# Patient Record
Sex: FEMALE | Race: WHITE | URBAN - METROPOLITAN AREA
[De-identification: names, ages, dates, MRNs, and addresses within clinical notes are randomized per-mention and may not be internally consistent; named-entity substitution may affect disease eponyms.]

---

## 2017-01-20 ENCOUNTER — HOSPITAL ENCOUNTER (EMERGENCY)
Facility: CLINIC | Age: 32
Discharge: HOME | End: 2017-01-20
Attending: EMERGENCY MEDICINE | Admitting: EMERGENCY MEDICINE
Payer: COMMERCIAL

## 2017-01-20 ENCOUNTER — APPOINTMENT (OUTPATIENT)
Dept: ULTRASOUND IMAGING | Facility: CLINIC | Age: 32
End: 2017-01-20
Attending: EMERGENCY MEDICINE
Payer: COMMERCIAL

## 2017-01-20 VITALS
DIASTOLIC BLOOD PRESSURE: 71 MMHG | TEMPERATURE: 99 F | OXYGEN SATURATION: 99 % | SYSTOLIC BLOOD PRESSURE: 114 MMHG | WEIGHT: 164 LBS | RESPIRATION RATE: 16 BRPM

## 2017-01-20 DIAGNOSIS — N93.9 VAGINAL BLEEDING: ICD-10-CM

## 2017-01-20 DIAGNOSIS — R10.30 ABDOMINAL PAIN, LOWER: ICD-10-CM

## 2017-01-20 DIAGNOSIS — O20.0 THREATENED MISCARRIAGE: ICD-10-CM

## 2017-01-20 LAB
ALBUMIN UR-MCNC: NEGATIVE MG/DL
APPEARANCE UR: CLEAR
B-HCG SERPL-ACNC: 5137 IU/L
BILIRUB UR QL STRIP: NEGATIVE
COLOR UR AUTO: YELLOW
GLUCOSE UR STRIP-MCNC: NEGATIVE MG/DL
HGB BLD-MCNC: 12.1 G/DL (ref 11.7–15.7)
HGB UR QL STRIP: ABNORMAL
KETONES UR STRIP-MCNC: NEGATIVE MG/DL
LEUKOCYTE ESTERASE UR QL STRIP: ABNORMAL
MUCOUS THREADS #/AREA URNS LPF: PRESENT /LPF
NITRATE UR QL: NEGATIVE
PH UR STRIP: 5.5 PH (ref 5–7)
RBC #/AREA URNS AUTO: 1 /HPF (ref 0–2)
SP GR UR STRIP: 1.01 (ref 1–1.03)
SQUAMOUS #/AREA URNS AUTO: 1 /HPF (ref 0–1)
URN SPEC COLLECT METH UR: ABNORMAL
UROBILINOGEN UR STRIP-MCNC: NORMAL MG/DL (ref 0–2)
WBC #/AREA URNS AUTO: 1 /HPF (ref 0–2)

## 2017-01-20 PROCEDURE — 99284 EMERGENCY DEPT VISIT MOD MDM: CPT

## 2017-01-20 PROCEDURE — 84702 CHORIONIC GONADOTROPIN TEST: CPT | Performed by: EMERGENCY MEDICINE

## 2017-01-20 PROCEDURE — 76801 OB US < 14 WKS SINGLE FETUS: CPT

## 2017-01-20 PROCEDURE — 81001 URINALYSIS AUTO W/SCOPE: CPT | Performed by: EMERGENCY MEDICINE

## 2017-01-20 PROCEDURE — 85018 HEMOGLOBIN: CPT | Performed by: EMERGENCY MEDICINE

## 2017-01-20 PROCEDURE — 36415 COLL VENOUS BLD VENIPUNCTURE: CPT | Performed by: EMERGENCY MEDICINE

## 2017-01-20 ASSESSMENT — ENCOUNTER SYMPTOMS: ABDOMINAL PAIN: 1

## 2017-01-20 NOTE — ED PROVIDER NOTES
History     Chief Complaint:  Vaginal Bleeding      HPI   Cheryl Coles is a 31 year old  female who is 7 weeks pregnant who presents with onset of vaginal bleeding last night. The patient complains of spotting on her toilet tissue that was bright red and now reddish or brownish in color. The patient also complains of lower abdominal cramping. The patient states that movement makes the pain worse and states that the cramping feels similar to menstrual cramps. The patient denies falls trauma or falling. Of note, the patient had a blood draw done at [facility] on 2017 with the results showing her to have A POS blood type, HGB 13, and hematocrit being 3.93.    Allergies:  No known drug allergies.    Medications:    The patient is currently on no regular medications.     Past Medical History:    History reviewed.  No significant past medical history.     Past Surgical History:    History reviewed. No pertinent past surgical history.    Family History:    History reviewed. No pertinent family history.    Social History:  Marital Status:   Presents to the ED alone  PCP: [facility] [facility]    Review of Systems   Gastrointestinal: Positive for abdominal pain.   Genitourinary: Positive for vaginal bleeding.   All other systems reviewed and are negative.    Physical Exam   First Vitals:  BP: 116/72 mmHg  Heart Rate: 85  Temp: 99  F (37.2  C)  Resp: 18  Weight: 74.39 kg (164 lb)  SpO2: 99 %    Physical Exam   General: The patient is alert, in no respiratory distress.    HENT: Mucous membranes moist.    Cardiovascular: Regular rate and rhythm. Good pulses in all four extremities. Normal capillary refill and skin turgor.     Respiratory: Lungs are clear. No nasal flaring. No retractions. No wheezing, no crackles.    Gastrointestinal: Abdomen soft. No guarding, no rebound. No palpable hernias. Suprapubic abdominal tenderness.  Pelvis: Cervix is closed with no bleeding.    Musculoskeletal: No gross  deformity.     Skin: No rashes or petechiae.     Neurologic: The patient is alert and oriented x3. GCS 15. No testable cranial nerve deficit. Follows commands with clear and appropriate speech. Gives appropriate answers. Good strength in all extremities. No gross neurologic deficit. Gross sensation intact. Pupils are round and reactive. No meningismus.     Lymphatic: No cervical adenopathy. No lower extremity swelling.    Psychiatric: The patient is non-tearful.    Emergency Department Course   Imaging:  Radiographic findings were communicated with the patient who voiced understanding of the findings.      US OB < 14 Weeks w Transvaginal   Final Result   IMPRESSION: Single live intrauterine pregnancy of estimated   gestational age 6 weeks.      MRALIN WILBURN MD          All Readings Per Radiology Unless Otherwise Noted.    Laboratory:  Hemoglobin: 12.1  HCG Quantitative pregnancy: 5137  UA: Clear yellow urine, Blood Small, Leukocyte Esterase Moderate, Mucous Present, otherwise WNL    ED Course:  Nursing notes and past medical history reviewed.   I performed a physical examination of the patient as documented above.  I explained the plan with the patient who consents to this.   The patient was sent for a US OB < 14 weeks with transvaginal while in the emergency department, findings above.   Blood was drawn from the patient. This was sent for laboratory testing, findings above.   Urine sample was obtained and sent for laboratory analysis, findings above.  I personally reviewed the laboratory and imaging results with the patient and answered all related questions prior to discharge.  (1511) Rechecked patient and performed pelvic exam.  Findings and plan explained to the patient. Patient discharged home with instructions regarding supportive care, medications, and reasons to return. The importance of close follow-up was reviewed.     Impression & Plan    Medical Decision Making:  The patient is pregnant and said that  she already had an ultrasound but they could not definitively locate an IUP, there was a sac however therefore, ectopic is on the differential especially with the vaginal bleeding. By her pelvic exam performed by myself there was no active bleeding, her cervix was closed and infect, she does have an IUP with no subchorionic hemorrhage. The exact cause of the current blood is unknown and the patient is otherwise stable and her hemoglobin is adequate. I did access her outside labs and she is Rh positive and does not require Rhogam. She has a benign exam and no signs of a UTI. She was discharged to follow up with her OB/GYN and felt comfortable with the plan. The patient agrees to return if any new symptoms develop.    Diagnosis:    ICD-10-CM    1. Threatened miscarriage O20.0    2. Abdominal pain, lower R10.30    3. Vaginal bleeding N93.9        Disposition:   Discharge to home.    Dmitriy ALARCON, am serving as a scribe on 1/20/2017 at 8:43 AM to personally document services performed by Branden Murillo MD, based on my observations and the provider's statements to me.      Branden Murillo MD  01/21/17 [id number]

## 2017-01-20 NOTE — ED AVS SNAPSHOT
[facility] Emergency Department    [address]    [facility] [address]    Phone:  [phone]    Fax:  [phone]                                       Cheryl Coles   MRN: [id number]    Department:  [facility] Emergency Department   Date of Visit:  1/20/2017           After Visit Summary Signature Page     I have received my discharge instructions, and my questions have been answered. I have discussed any challenges I see with this plan with the nurse or doctor.    ..........................................................................................................................................  Patient/Patient Representative Signature      ..........................................................................................................................................  Patient Representative Print Name and Relationship to Patient    ..................................................               ................................................  Date                                            Time    ..........................................................................................................................................  Reviewed by Signature/Title    ...................................................              ..............................................  Date                                                            Time

## 2017-01-20 NOTE — ED NOTES
Alert and oriented x 3 airway,breathing and circulation intact, 7 weeks pregnant now is having some light vaginal bleeding(on tissue only) but lower abd cramping and bilateral back pain

## 2017-01-20 NOTE — ED AVS SNAPSHOT
[facility] Emergency Department    [address] [place] [address]    Phone:  [phone]    Fax:  [phone]                                       Cheryl Coles   MRN: [id number]    Department:  [facility] Emergency Department   Date of Visit:  1/20/2017           Patient Information     Date Of Birth          1985        Your diagnoses for this visit were:     Threatened miscarriage     Abdominal pain, lower     Vaginal bleeding        You were seen by Branden Murillo MD.      Follow-up Information     Follow up with your OB. Schedule an appointment as soon as possible for a visit in 4 days.        Discharge Instructions       Discharge Instructions  Vaginal Bleeding in Pregnancy    Bleeding in early pregnancy can be a sign of a miscarriage in process or an abnormal pregnancy, but often is innocent and the pregnancy will continue normally. We may do blood pregnancy tests and ultrasound to try to determine what is causing the bleeding in your case, but sometimes we can't tell and need to follow you with time, more blood tests, and another ultrasound.     Return to the Emergency Department if:    You have severe abdominal or pelvic pain.    You faint, or feel lightheaded or dizzy.     Your bleeding is gets much worse, and is heavier than a heavy period or if you pass any blood clots larger than a quarter.    You pass any tissue--solid material that doesn't appear smooth and even like a blood clot. If you pass tissue, save it (even if you have to pull it out of the toilet) and put it in a plastic bag or jar and bring it in.      You have a fever of 100.5 degrees or higher.  If no pregnancy could be seen:    It may be that everything is normal and it is just too early to see the pregnancy, but you could have an ectopic pregnancy, which is a pregnancy in an abnormal location, such as in the tube. An ectopic pregnancy can cause severe internal bleeding or  death.    You need to be seen by your regular doctor, or an OB/GYN doctor, in 48-72 hours for a repeat blood pregnancy test.    You should not be alone, in case you suddenly become very sick.     You should not have sex or put anything in your vagina.     If a pregnancy was seen in your uterus:    If a heartbeat could be seen, the chance of miscarriage is much lower.    You need to see your regular doctor, or an OB/GYN doctor, within 2-3 days.    You should not have sex or put anything in your vagina.     Facts about miscarriage: We hope you don't have a miscarriage, but if you do, here are important things to know:    Early miscarriage is very common, and having one miscarriage doesn't mean you will have problems with another pregnancy.    Nothing you did caused it. Taking medicine, drinking alcohol, having sex, exercising, or falling down won't cause a miscarriage.     If you were given a prescription for medicine here today, be sure to read all of the information (including the package insert) that comes with your prescription.  This will include important information about the medicine, its side effects, and any warnings that you need to know about.  The pharmacist who fills the prescription can provide more information and answer questions you may have about the medicine.  If you have questions or concerns that the pharmacist cannot address, please call or return to the Emergency Department.     Opioid Medication Information    Pain medications are among the most commonly prescribed medicines, so we are including this information for all our patients. If you did not receive pain medication or get a prescription for pain medicine, you can ignore it.     You may have been given a prescription for an opioid (narcotic) pain medicine and/or have received a pain medicine while here in the Emergency Department. These medicines can make you drowsy or impaired. You must not drive, operate dangerous equipment, or engage  in any other dangerous activities while taking these medications. If you drive while taking these medications, you could be arrested for DUI, or driving under the influence. Do not drink any alcohol while you are taking these medications.     Opioid pain medications can cause addiction. If you have a history of chemical dependency of any type, you are at a higher risk of becoming addicted to pain medications.  Only take these prescribed medications to treat your pain when all other options have been tried. Take it for as short a time and as few doses as possible. Store your pain pills in a secure place, as they are frequently stolen and provide a dangerous opportunity for children or visitors in your house to start abusing these powerful medications. We will not replace any lost or stolen medicine.  As soon as your pain is better, you should flush all your remaining medication.     Many prescription pain medications contain Tylenol  (acetaminophen), including Vicodin , Tylenol #3 , Norco , Lortab , and Percocet .  You should not take any extra pills of Tylenol  if you are using these prescription medications or you can get very sick.  Do not ever take more than 3000 mg of acetaminophen in any 24 hour period.    All opioids tend to cause constipation. Drink plenty of water and eat foods that have a lot of fiber, such as fruits, vegetables, prune juice, apple juice and high fiber cereal.  Take a laxative if you don t move your bowels at least every other day. Miralax , Milk of Magnesia, Colace , or Senna  can be used to keep you regular.      Remember that you can always come back to the Emergency Department if you are not able to see your regular doctor in the amount of time listed above, if you get any new symptoms, or if there is anything that worries you.        24 Hour Appointment Hotline       To make an appointment at any [facility], call [phone] ([phone]). If you don't have a family doctor  or clinic, we will help you find one. [facility] (formerly [facility]) are conveniently located to serve the needs of you and your family.             Review of your medicines      Notice     You have not been prescribed any medications.            Procedures and tests performed during your visit     HCG QUANTitative pregnancy    Hemoglobin    Prep for procedure - pelvic exam    UA with Microscopic    US OB < 14 Weeks w Transvaginal    Vital signs      Orders Needing Specimen Collection     None      Pending Results     No orders found from 1/19/2017 to 1/21/2017.            Pending Culture Results     No orders found from 1/19/2017 to 1/21/2017.       Test Results from your hospital stay           1/20/2017  7:28 AM - Interface, Flexilab Results      Component Results     Component Value Ref Range & Units Status    Hemoglobin 12.1 11.7 - 15.7 g/dL Final         1/20/2017  7:31 AM - Interface, Flexilab Results      Component Results     Component Value Ref Range & Units Status    Color Urine Yellow  Final    Appearance Urine Clear  Final    Glucose Urine Negative NEG mg/dL Final    Bilirubin Urine Negative NEG Final    Ketones Urine Negative NEG mg/dL Final    Specific Gravity Urine 1.014 1.003 - 1.035 Final    Blood Urine Small (A) NEG Final    pH Urine 5.5 5.0 - 7.0 pH Final    Protein Albumin Urine Negative NEG mg/dL Final    Urobilinogen mg/dL Normal 0.0 - 2.0 mg/dL Final    Nitrite Urine Negative NEG Final    Leukocyte Esterase Urine Moderate (A) NEG Final    Source Midstream Urine  Final    WBC Urine 1 0 - 2 /HPF Final    RBC Urine 1 0 - 2 /HPF Final    Squamous Epithelial /HPF Urine 1 0 - 1 /HPF Final    Mucous Urine Present (A) NEG /LPF Final         1/20/2017  8:06 AM - Interface, Flexilab Results      Component Results     Component Value Ref Range & Units Status    HCG Quantitative Serum 5137 IU/L Final    Specimen run with a dilution         1/20/2017  8:05 AM - Interface, Radiant Ib      Narrative     US OB <14 WEEKS  WITH TRANSVAGINAL SINGLE  1/20/2017 7:56 AM    HISTORY: Abdominal pain and vaginal bleeding during early pregnancy.    TECHNIQUE: Transabdominal and transvaginal imaging were performed.   Transvaginal imaging was performed to better evaluate the uterus and  gestational sac.    COMPARISON:  None.    FINDINGS:      Estimated gestational age by current ultrasound measurement: 6 weeks 0  days.  Estimated date of delivery based on this ultrasound: 9/15/2017.  Crown-rump length: 0.3 cm.   Embryonic cardiac activity: 120 bpm.   Yolk sac: Present.  Subchorionic hemorrhage: None.    Right ovary: Unremarkable.  Left ovary: Contains a small probable corpus luteum cyst.  Adnexal mass: None.  Free pelvic fluid: None.        Impression     IMPRESSION: Single live intrauterine pregnancy of estimated  gestational age 6 weeks.    MARLIN WILBURN MD                Clinical Quality Measure: Blood Pressure Screening     Your blood pressure was checked while you were in the emergency department today. The last reading we obtained was  BP: 114/71 mmHg . Please read the guidelines below about what these numbers mean and what you should do about them.  If your systolic blood pressure (the top number) is less than 120 and your diastolic blood pressure (the bottom number) is less than 80, then your blood pressure is normal. There is nothing more that you need to do about it.  If your systolic blood pressure (the top number) is 120-139 or your diastolic blood pressure (the bottom number) is 80-89, your blood pressure may be higher than it should be. You should have your blood pressure rechecked within a year by a primary care provider.  If your systolic blood pressure (the top number) is 140 or greater or your diastolic blood pressure (the bottom number) is 90 or greater, you may have high blood pressure. High blood pressure is treatable, but if left untreated over time it can put you at risk for heart attack, stroke, or kidney failure. You  "should have your blood pressure rechecked by a primary care provider within the next 4 weeks.  If your provider in the emergency department today gave you specific instructions to follow-up with your doctor or provider even sooner than that, you should follow that instruction and not wait for up to 4 weeks for your follow-up visit.        Thank you for choosing [facility]       Thank you for choosing [facility] for your care. Our goal is always to provide you with excellent care. Hearing back from our patients is one way we can continue to improve our services. Please take a few minutes to complete the written survey that you may receive in the mail after you visit with us. Thank you!        [employer] Information     [employer] lets you send messages to your doctor, view your test results, renew your prescriptions, schedule appointments and more. To sign up, go to [email or web address] . Click on \"Log in\" on the left side of the screen, which will take you to the Welcome page. Then click on \"Sign up Now\" on the right side of the page.     You will be asked to enter the access code listed below, as well as some personal information. Please follow the directions to create your username and password.     Your access code is: [email or web address]  Expires: 2017  9:00 AM     Your access code will  in 90 days. If you need help or a new code, please call your [facility] clinic or [phone].        Care EveryWhere ID     This is your Care EveryWhere ID. This could be used by other organizations to access your [facility] medical records  [id number]        After Visit Summary       This is your record. Keep this with you and show to your community pharmacist(s) and doctor(s) at your next visit.                  "

## 2017-01-20 NOTE — DISCHARGE INSTRUCTIONS
Discharge Instructions  Vaginal Bleeding in Pregnancy    Bleeding in early pregnancy can be a sign of a miscarriage in process or an abnormal pregnancy, but often is innocent and the pregnancy will continue normally. We may do blood pregnancy tests and ultrasound to try to determine what is causing the bleeding in your case, but sometimes we can't tell and need to follow you with time, more blood tests, and another ultrasound.     Return to the Emergency Department if:    You have severe abdominal or pelvic pain.    You faint, or feel lightheaded or dizzy.     Your bleeding is gets much worse, and is heavier than a heavy period or if you pass any blood clots larger than a quarter.    You pass any tissue--solid material that doesn't appear smooth and even like a blood clot. If you pass tissue, save it (even if you have to pull it out of the toilet) and put it in a plastic bag or jar and bring it in.      You have a fever of 100.5 degrees or higher.  If no pregnancy could be seen:    It may be that everything is normal and it is just too early to see the pregnancy, but you could have an ectopic pregnancy, which is a pregnancy in an abnormal location, such as in the tube. An ectopic pregnancy can cause severe internal bleeding or death.    You need to be seen by your regular doctor, or an OB/GYN doctor, in 48-72 hours for a repeat blood pregnancy test.    You should not be alone, in case you suddenly become very sick.     You should not have sex or put anything in your vagina.     If a pregnancy was seen in your uterus:    If a heartbeat could be seen, the chance of miscarriage is much lower.    You need to see your regular doctor, or an OB/GYN doctor, within 2-3 days.    You should not have sex or put anything in your vagina.     Facts about miscarriage: We hope you don't have a miscarriage, but if you do, here are important things to know:    Early miscarriage is very common, and having one miscarriage doesn't mean  you will have problems with another pregnancy.    Nothing you did caused it. Taking medicine, drinking alcohol, having sex, exercising, or falling down won't cause a miscarriage.     If you were given a prescription for medicine here today, be sure to read all of the information (including the package insert) that comes with your prescription.  This will include important information about the medicine, its side effects, and any warnings that you need to know about.  The pharmacist who fills the prescription can provide more information and answer questions you may have about the medicine.  If you have questions or concerns that the pharmacist cannot address, please call or return to the Emergency Department.     Opioid Medication Information    Pain medications are among the most commonly prescribed medicines, so we are including this information for all our patients. If you did not receive pain medication or get a prescription for pain medicine, you can ignore it.     You may have been given a prescription for an opioid (narcotic) pain medicine and/or have received a pain medicine while here in the Emergency Department. These medicines can make you drowsy or impaired. You must not drive, operate dangerous equipment, or engage in any other dangerous activities while taking these medications. If you drive while taking these medications, you could be arrested for DUI, or driving under the influence. Do not drink any alcohol while you are taking these medications.     Opioid pain medications can cause addiction. If you have a history of chemical dependency of any type, you are at a higher risk of becoming addicted to pain medications.  Only take these prescribed medications to treat your pain when all other options have been tried. Take it for as short a time and as few doses as possible. Store your pain pills in a secure place, as they are frequently stolen and provide a dangerous opportunity for children or visitors  in your house to start abusing these powerful medications. We will not replace any lost or stolen medicine.  As soon as your pain is better, you should flush all your remaining medication.     Many prescription pain medications contain Tylenol  (acetaminophen), including Vicodin , Tylenol #3 , Norco , Lortab , and Percocet .  You should not take any extra pills of Tylenol  if you are using these prescription medications or you can get very sick.  Do not ever take more than 3000 mg of acetaminophen in any 24 hour period.    All opioids tend to cause constipation. Drink plenty of water and eat foods that have a lot of fiber, such as fruits, vegetables, prune juice, apple juice and high fiber cereal.  Take a laxative if you don t move your bowels at least every other day. Miralax , Milk of Magnesia, Colace , or Senna  can be used to keep you regular.      Remember that you can always come back to the Emergency Department if you are not able to see your regular doctor in the amount of time listed above, if you get any new symptoms, or if there is anything that worries you.

## 2017-01-23 ENCOUNTER — HOSPITAL ENCOUNTER (EMERGENCY)
Facility: CLINIC | Age: 32
Discharge: HOME | End: 2017-01-24
Attending: EMERGENCY MEDICINE | Admitting: EMERGENCY MEDICINE
Payer: COMMERCIAL

## 2017-01-23 DIAGNOSIS — O03.9 COMPLETE MISCARRIAGE: ICD-10-CM

## 2017-01-23 PROCEDURE — 36415 COLL VENOUS BLD VENIPUNCTURE: CPT | Performed by: EMERGENCY MEDICINE

## 2017-01-23 PROCEDURE — 85025 COMPLETE CBC W/AUTO DIFF WBC: CPT | Performed by: EMERGENCY MEDICINE

## 2017-01-23 PROCEDURE — 99284 EMERGENCY DEPT VISIT MOD MDM: CPT

## 2017-01-23 PROCEDURE — 80048 BASIC METABOLIC PNL TOTAL CA: CPT | Performed by: EMERGENCY MEDICINE

## 2017-01-23 NOTE — ED AVS SNAPSHOT
[facility] Emergency Department    [address]    [facility] [address]    Phone:  [phone]    Fax:  [phone]                                       Cheryl Coles   MRN: [id number]    Department:  [facility] Emergency Department   Date of Visit:  1/23/2017           After Visit Summary Signature Page     I have received my discharge instructions, and my questions have been answered. I have discussed any challenges I see with this plan with the nurse or doctor.    ..........................................................................................................................................  Patient/Patient Representative Signature      ..........................................................................................................................................  Patient Representative Print Name and Relationship to Patient    ..................................................               ................................................  Date                                            Time    ..........................................................................................................................................  Reviewed by Signature/Title    ...................................................              ..............................................  Date                                                            Time

## 2017-01-24 ENCOUNTER — APPOINTMENT (OUTPATIENT)
Dept: ULTRASOUND IMAGING | Facility: CLINIC | Age: 32
End: 2017-01-24
Attending: EMERGENCY MEDICINE
Payer: COMMERCIAL

## 2017-01-24 VITALS
OXYGEN SATURATION: 99 % | WEIGHT: 168.43 LBS | SYSTOLIC BLOOD PRESSURE: 115 MMHG | HEART RATE: 102 BPM | HEIGHT: 64 IN | DIASTOLIC BLOOD PRESSURE: 78 MMHG | RESPIRATION RATE: 20 BRPM | TEMPERATURE: 97.7 F | BODY MASS INDEX: 28.76 KG/M2

## 2017-01-24 LAB
ANION GAP SERPL CALCULATED.3IONS-SCNC: 9 MMOL/L (ref 3–14)
BASOPHILS # BLD AUTO: 0 10E9/L (ref 0–0.2)
BASOPHILS NFR BLD AUTO: 0.3 %
BUN SERPL-MCNC: 9 MG/DL (ref 7–30)
CALCIUM SERPL-MCNC: 8.8 MG/DL (ref 8.5–10.1)
CHLORIDE SERPL-SCNC: 108 MMOL/L (ref 94–109)
CO2 SERPL-SCNC: 22 MMOL/L (ref 20–32)
CREAT SERPL-MCNC: 0.46 MG/DL (ref 0.52–1.04)
DIFFERENTIAL METHOD BLD: ABNORMAL
EOSINOPHIL # BLD AUTO: 0.2 10E9/L (ref 0–0.7)
EOSINOPHIL NFR BLD AUTO: 1.6 %
ERYTHROCYTE [DISTWIDTH] IN BLOOD BY AUTOMATED COUNT: 12.9 % (ref 10–15)
GFR SERPL CREATININE-BSD FRML MDRD: ABNORMAL ML/MIN/1.7M2
GLUCOSE SERPL-MCNC: 103 MG/DL (ref 70–99)
HCT VFR BLD AUTO: 38.9 % (ref 35–47)
HGB BLD-MCNC: 13.1 G/DL (ref 11.7–15.7)
IMM GRANULOCYTES # BLD: 0 10E9/L (ref 0–0.4)
IMM GRANULOCYTES NFR BLD: 0.3 %
LYMPHOCYTES # BLD AUTO: 3.1 10E9/L (ref 0.8–5.3)
LYMPHOCYTES NFR BLD AUTO: 25.7 %
MCH RBC QN AUTO: 28.2 PG (ref 26.5–33)
MCHC RBC AUTO-ENTMCNC: 33.7 G/DL (ref 31.5–36.5)
MCV RBC AUTO: 84 FL (ref 78–100)
MONOCYTES # BLD AUTO: 0.7 10E9/L (ref 0–1.3)
MONOCYTES NFR BLD AUTO: 5.8 %
NEUTROPHILS # BLD AUTO: 7.9 10E9/L (ref 1.6–8.3)
NEUTROPHILS NFR BLD AUTO: 66.3 %
NRBC # BLD AUTO: 0 10*3/UL
NRBC BLD AUTO-RTO: 0 /100
PLATELET # BLD AUTO: 342 10E9/L (ref 150–450)
POTASSIUM SERPL-SCNC: 3.7 MMOL/L (ref 3.4–5.3)
RBC # BLD AUTO: 4.65 10E12/L (ref 3.8–5.2)
SODIUM SERPL-SCNC: 139 MMOL/L (ref 133–144)
WBC # BLD AUTO: 11.9 10E9/L (ref 4–11)

## 2017-01-24 PROCEDURE — 76801 OB US < 14 WKS SINGLE FETUS: CPT

## 2017-01-24 ASSESSMENT — ENCOUNTER SYMPTOMS: DYSURIA: 0

## 2017-01-24 NOTE — ED PROVIDER NOTES
"  History     Chief Complaint:  Vaginal Bleeding      HPI   Cheryl Coles is a 31 year old  female approximately 6 weeks pregnant who presents with vaginal bleeding. The patient was seen here in the ED on  for vaginal bleeding and had an ultrasound demonstrating a single live IUP with estimated gestational age of 6 weeks. At that time, her quantitative HCG was also 5137. The patient was discharged with instructions to follow up with her OB/GYN who she saw earlier today. At her visit today with Dr. Villalpando, she had blood work completed [place] [address]. The patient states that tonight at , she developed increased bright red vaginal bleeding as well as cramping. She notes that she has been using one pad per day. Approximately 20 minutes ago, the patient states that she used the bathroom and noted that she passed clots. She denies any dysuria. According to the patient and per chart review in [facility], patient's blood type is A positive.     Allergies:  No known drug allergies.      Medications:    The patient is currently on no regular medications.       Past Medical History:    History reviewed.  No significant past medical history.       Past Surgical History:    History reviewed. No pertinent past surgical history.      Family History:    History reviewed. No pertinent family history.      Social History:  Marital Status:   Presents to the ED with   Tobacco Use: Never Used  Alcohol Use: No  PCP: [facility]      Review of Systems   Genitourinary: Positive for vaginal bleeding and pelvic pain. Negative for dysuria.   All other systems reviewed and are negative.    Physical Exam   First Vitals:  BP: 112/73 mmHg  Pulse: 102  Heart Rate: 102  Temp: 97.7  F (36.5  C)  Resp: 20  Height: 162.6 cm (5' 4\")  Weight: 76.4 kg (168 lb 6.9 oz)  SpO2: 99 %    Physical Exam  Constitutional:  Oriented to person, place, and time. Well Appearing  HENT:   Head: "    Normocephalic.   Mouth/Throat:   Oropharynx is clear and moist.   Eyes:    EOM are normal. Pupils are equal, round, and reactive to light.   Neck:    Neck supple.   Cardiovascular:  Normal rate, regular rhythm and normal heart sounds.      Exam reveals no gallop and no friction rub.       No murmur heard.  Pulmonary/Chest:  Effort normal and breath sounds normal.      No respiratory distress. No wheezes. No rales.      No reproducible chest wall pain.  Abdominal:   Soft. No distension. No tenderness. No rebound and no guarding.   :    Blood within the vaginal vault.     No obvious products of conception.     Cervix is closed. No CMT. No adnexal tenderness  Musculoskeletal:  Normal range of motion.   Neurological:   Alert and oriented to person, place, and time.           Moves all 4 extremities spontaneously    Skin:    No rash noted. No pallor.      Emergency Department Course   Imaging:  US OB < 14 Weeks with transvaginal:   No evidence of intrauterine pregnancy. There has been an  interval miscarriage since the prior exam.  Preliminary radiology read.     Radiographic findings were communicated with the patient who voiced understanding of the findings.    Laboratory:  CBC:  WBC 11.9 (H), HGB 13.1, , otherwise WNL   BMP: Glucose 103 (H), Creatinine 0.46 (L), otherwise WNL     Emergency Department Course:  Nursing notes and vitals reviewed.  I performed an exam of the patient as documented above.    Blood was drawn from the patient. This was sent for laboratory testing, findings above.    The patient was sent for a US OB while in the emergency department, findings above.    Findings and plan explained to the patient. Patient discharged home with instructions regarding supportive care, medications, and reasons to return. The importance of close follow-up was reviewed.   I personally reviewed the laboratory results with the patient and answered all related questions prior to discharge.      Impression &  Plan    Medical Decision Making:  Cheryl Coles is a 31 year old  who presents to the ED for evaluation of vaginal bleeding and abdominal cramping.  The patient is currently pregnant based on ultrasound on 17 showing  single live IUP with estimated gestational age of 6 weeks. She is Rh positive. Ultrasound was obtained and shows no evidence of intrauterine pregnancy.  These findings were discussed with the patient and miscarriage precautions and instructions were provided.    There is no evidence of retained products of conception.  The patient has  established care with OB and will follow up as instructed.  Return precautions given.          Diagnosis:    ICD-10-CM    1. Complete miscarriage O03.9        Disposition:  discharged to home        IJustyna, am serving as a scribe on 2017 at 11:29 PM to personally document services performed by Dr. Lindquist based on my observations and the provider's statements to me.   2017   [facility] EMERGENCY DEPARTMENT        Sj Lindquist MD  [phone]

## 2017-01-24 NOTE — ED NOTES
Reports vaginal bleeding began with spotting Friday, states moderate bleeding throughout the weekend and heavy bleeding today with possible tissue passed; states 6 weeks pregnant per home pregnancy test. States seen Friday in ED for spotting, told baby was ok.  ABCs intact.  States hx of 4 live births and 1 miscarriage.  States she is having severe cramping.

## 2017-01-24 NOTE — DISCHARGE INSTRUCTIONS
Completed Spontaneous Miscarriage  Today's exams show your pregnancy has ended suddenly. We understand that this is emotionally difficult. There is little we can say to change the way you feel. But understand that miscarriages are common.  About 1 or 2 out of every 10 pregnancies end this way. Some end even before you know you are pregnant. This happens for a number of reasons, and usually we never figure out why. It s important you know that it is not your fault. It didn t happen because you did anything wrong.  Having sex or exercising does not cause a miscarriage. These activities are usually safe unless you have pain or bleeding or your doctor tells you to stop. Even minor falls won t cause a miscarriage. Miscarriages happen because things were not developing as they were supposed to.  It appears that your miscarriage is complete. All tissue from the pregnancy should have passed out of your uterus. If parts of the pregnancy tissue remains in the uterus, you will probably have more cramping and bleeding. The bleeding can be light spotting or like a period, but it is usually not heavy. You may also pass some tissue.  After you have recovered, you should still be able to get pregnant again. But before trying, talk with your health care provider.  Home care  Follow these tips to take of  yourself at home:    You can go back to your normal activities if you don t have heavy bleeding or pain.    You may have some cramping and bleeding, but it shouldn t be severe.  Until the bleeding stops completely and to prevent infection:    Don t have sex until your health care provider says it s OK    Use sanitary napkins instead of tampons.    Don t douche.  Having a miscarriage can be very difficult emotionally. It is natural to feel sadness or grief. It may help to talk about your feelings with family and friends, or with a counselor.  Follow-up care  Make an appointment to see your health care provider in 1 to 2 weeks for a  checkup.  If cramping and bleeding return and continue for more than a few days, call your health care provider or return here for an exam. To prevent infection in the uterus, your provider might need to take out any tissue that remains. Or you may be given medication to take at home to help your body expel the rest of the tissue.  Note: If you had an ultrasound, a radiologist will review it. You will be told of any new findings that may affect your care.  Call 911  Call 911 if you have:    Severe pain and very heavy bleeding    Severe lightheadedness, passing out, or fainting    Rapid heart rate    Difficulty breathing    Confusion or difficulty waking up  When to seek medical advice  Call your health care provider right away if any of these occur:    Heavy bleeding. This means soaking 1 new pad an hour over 3 hours.    Bleeding that doesn t stop after 10 days    Foul-smelling vaginal discharge    Fever of 100.4 F (38 C) or higher, or as told by your health care provider    Pain in your lower belly (abdomen) that gets worse    Weakness or dizziness       5782-7469 [employer]. [address] [place] [address]. All rights reserved. This information is not intended as a substitute for professional medical care. Always follow your healthcare professional's instructions.      Discharge Instructions  Vaginal Bleeding in Pregnancy    Bleeding in early pregnancy can be a sign of a miscarriage in process or an abnormal pregnancy, but often is innocent and the pregnancy will continue normally. We may do blood pregnancy tests and ultrasound to try to determine what is causing the bleeding in your case, but sometimes we can't tell and need to follow you with time, more blood tests, and another ultrasound.     Return to the Emergency Department if:    You have severe abdominal or pelvic pain.    You faint, or feel lightheaded or dizzy.     Your bleeding is gets much worse, and is heavier than a heavy  period or if you pass any blood clots larger than a quarter.    You pass any tissue--solid material that doesn't appear smooth and even like a blood clot. If you pass tissue, save it (even if you have to pull it out of the toilet) and put it in a plastic bag or jar and bring it in.      You have a fever of 100.5 degrees or higher.  If no pregnancy could be seen:    It may be that everything is normal and it is just too early to see the pregnancy, but you could have an ectopic pregnancy, which is a pregnancy in an abnormal location, such as in the tube. An ectopic pregnancy can cause severe internal bleeding or death.    You need to be seen by your regular doctor, or an OB/GYN doctor, in 48-72 hours for a repeat blood pregnancy test.    You should not be alone, in case you suddenly become very sick.     You should not have sex or put anything in your vagina.     If a pregnancy was seen in your uterus:    If a heartbeat could be seen, the chance of miscarriage is much lower.    You need to see your regular doctor, or an OB/GYN doctor, within 2-3 days.    You should not have sex or put anything in your vagina.     Facts about miscarriage: We hope you don't have a miscarriage, but if you do, here are important things to know:    Early miscarriage is very common, and having one miscarriage doesn't mean you will have problems with another pregnancy.    Nothing you did caused it. Taking medicine, drinking alcohol, having sex, exercising, or falling down won't cause a miscarriage.     If you were given a prescription for medicine here today, be sure to read all of the information (including the package insert) that comes with your prescription.  This will include important information about the medicine, its side effects, and any warnings that you need to know about.  The pharmacist who fills the prescription can provide more information and answer questions you may have about the medicine.  If you have questions or  concerns that the pharmacist cannot address, please call or return to the Emergency Department.     Opioid Medication Information    Pain medications are among the most commonly prescribed medicines, so we are including this information for all our patients. If you did not receive pain medication or get a prescription for pain medicine, you can ignore it.     You may have been given a prescription for an opioid (narcotic) pain medicine and/or have received a pain medicine while here in the Emergency Department. These medicines can make you drowsy or impaired. You must not drive, operate dangerous equipment, or engage in any other dangerous activities while taking these medications. If you drive while taking these medications, you could be arrested for DUI, or driving under the influence. Do not drink any alcohol while you are taking these medications.     Opioid pain medications can cause addiction. If you have a history of chemical dependency of any type, you are at a higher risk of becoming addicted to pain medications.  Only take these prescribed medications to treat your pain when all other options have been tried. Take it for as short a time and as few doses as possible. Store your pain pills in a secure place, as they are frequently stolen and provide a dangerous opportunity for children or visitors in your house to start abusing these powerful medications. We will not replace any lost or stolen medicine.  As soon as your pain is better, you should flush all your remaining medication.     Many prescription pain medications contain Tylenol  (acetaminophen), including Vicodin , Tylenol #3 , Norco , Lortab , and Percocet .  You should not take any extra pills of Tylenol  if you are using these prescription medications or you can get very sick.  Do not ever take more than 3000 mg of acetaminophen in any 24 hour period.    All opioids tend to cause constipation. Drink plenty of water and eat foods that have a lot  of fiber, such as fruits, vegetables, prune juice, apple juice and high fiber cereal.  Take a laxative if you don t move your bowels at least every other day. Miralax , Milk of Magnesia, Colace , or Senna  can be used to keep you regular.      Remember that you can always come back to the Emergency Department if you are not able to see your regular doctor in the amount of time listed above, if you get any new symptoms, or if there is anything that worries you.

## 2017-08-29 LAB
HBV SURFACE AG SERPL QL IA: NEGATIVE
HIV 1+2 AB+HIV1 P24 AG SERPL QL IA: NEGATIVE
RUBELLA ANTIBODY IGG QUANTITATIVE: NORMAL IU/ML
T PALLIDUM IGG SER QL: NONREACTIVE

## 2018-03-06 ENCOUNTER — SURGERY (OUTPATIENT)
Age: 33
End: 2018-03-06

## 2018-03-06 ENCOUNTER — APPOINTMENT (OUTPATIENT)
Dept: ULTRASOUND IMAGING | Facility: CLINIC | Age: 33
End: 2018-03-06
Attending: OBSTETRICS & GYNECOLOGY
Payer: COMMERCIAL

## 2018-03-06 ENCOUNTER — ANESTHESIA EVENT (OUTPATIENT)
Dept: OBGYN | Facility: CLINIC | Age: 33
End: 2018-03-06
Payer: COMMERCIAL

## 2018-03-06 ENCOUNTER — HOSPITAL ENCOUNTER (INPATIENT)
Facility: CLINIC | Age: 33
LOS: 3 days | Discharge: HOME | End: 2018-03-09
Attending: OBSTETRICS & GYNECOLOGY | Admitting: OBSTETRICS & GYNECOLOGY
Payer: COMMERCIAL

## 2018-03-06 ENCOUNTER — ANESTHESIA (OUTPATIENT)
Dept: OBGYN | Facility: CLINIC | Age: 33
End: 2018-03-06
Payer: COMMERCIAL

## 2018-03-06 PROBLEM — O41.03X0 OLIGOHYDRAMNIOS IN THIRD TRIMESTER: Status: ACTIVE | Noted: 2018-03-06

## 2018-03-06 PROBLEM — O41.03X0 OLIGOHYDRAMNIOS IN THIRD TRIMESTER: Status: RESOLVED | Noted: 2018-03-06 | Resolved: 2018-03-06

## 2018-03-06 PROBLEM — O32.2XX0 TRANSVERSE LIE OF FETUS: Status: RESOLVED | Noted: 2018-03-06 | Resolved: 2018-03-06

## 2018-03-06 PROBLEM — O45.90 PLACENTAL ABRUPTION, DELIVERED, CURRENT HOSPITALIZATION: Status: ACTIVE | Noted: 2018-03-06

## 2018-03-06 PROBLEM — O45.93 PLACENTAL ABRUPTION IN THIRD TRIMESTER: Status: RESOLVED | Noted: 2018-03-06 | Resolved: 2018-03-06

## 2018-03-06 PROBLEM — O32.2XX0 TRANSVERSE LIE OF FETUS: Status: ACTIVE | Noted: 2018-03-06

## 2018-03-06 PROBLEM — O45.93 PLACENTAL ABRUPTION IN THIRD TRIMESTER: Status: ACTIVE | Noted: 2018-03-06

## 2018-03-06 PROBLEM — O41.00X0 OLIGOHYDRAMNIOS, DELIVERED, CURRENT HOSPITALIZATION: Status: ACTIVE | Noted: 2018-03-06

## 2018-03-06 PROBLEM — O46.93: Status: ACTIVE | Noted: 2018-03-06

## 2018-03-06 PROBLEM — O46.93: Status: RESOLVED | Noted: 2018-03-06 | Resolved: 2018-03-06

## 2018-03-06 PROBLEM — O32.2XX0 TRANSVERSE FETAL LIE, DELIVERED, CURRENT HOSPITALIZATION: Status: ACTIVE | Noted: 2018-03-06

## 2018-03-06 LAB
ABO + RH BLD: NORMAL
ABO + RH BLD: NORMAL
AMPHETAMINES UR QL SCN: NEGATIVE
BLD GP AB SCN SERPL QL: NORMAL
BLOOD BANK CMNT PATIENT-IMP: NORMAL
CANNABINOIDS UR QL: NEGATIVE
COCAINE UR QL: NEGATIVE
ERYTHROCYTE [DISTWIDTH] IN BLOOD BY AUTOMATED COUNT: 17 % (ref 10–15)
HCT VFR BLD AUTO: 30.9 % (ref 35–47)
HGB BLD-MCNC: 9.8 G/DL (ref 11.7–15.7)
MCH RBC QN AUTO: 26.7 PG (ref 26.5–33)
MCHC RBC AUTO-ENTMCNC: 31.7 G/DL (ref 31.5–36.5)
MCV RBC AUTO: 84 FL (ref 78–100)
OPIATES UR QL SCN: NEGATIVE
PCP UR QL SCN: NEGATIVE
PLATELET # BLD AUTO: 310 10E9/L (ref 150–450)
RBC # BLD AUTO: 3.67 10E12/L (ref 3.8–5.2)
SPECIMEN EXP DATE BLD: NORMAL
WBC # BLD AUTO: 7.7 10E9/L (ref 4–11)

## 2018-03-06 PROCEDURE — 25000125 ZZHC RX 250: Performed by: OBSTETRICS & GYNECOLOGY

## 2018-03-06 PROCEDURE — 86850 RBC ANTIBODY SCREEN: CPT | Performed by: OBSTETRICS & GYNECOLOGY

## 2018-03-06 PROCEDURE — 80307 DRUG TEST PRSMV CHEM ANLYZR: CPT | Performed by: OBSTETRICS & GYNECOLOGY

## 2018-03-06 PROCEDURE — 25800025 ZZH RX 258: Performed by: OBSTETRICS & GYNECOLOGY

## 2018-03-06 PROCEDURE — 36000058 ZZH SURGERY LEVEL 3 EA 15 ADDTL MIN: Performed by: OBSTETRICS & GYNECOLOGY

## 2018-03-06 PROCEDURE — 37000009 ZZH ANESTHESIA TECHNICAL FEE, EACH ADDTL 15 MIN: Performed by: OBSTETRICS & GYNECOLOGY

## 2018-03-06 PROCEDURE — 25000128 H RX IP 250 OP 636: Performed by: NURSE ANESTHETIST, CERTIFIED REGISTERED

## 2018-03-06 PROCEDURE — 86901 BLOOD TYPING SEROLOGIC RH(D): CPT | Performed by: OBSTETRICS & GYNECOLOGY

## 2018-03-06 PROCEDURE — 12000029 ZZH R&B OB INTERMEDIATE

## 2018-03-06 PROCEDURE — 88307 TISSUE EXAM BY PATHOLOGIST: CPT | Mod: 26 | Performed by: OBSTETRICS & GYNECOLOGY

## 2018-03-06 PROCEDURE — 71000015 ZZH RECOVERY PHASE 1 LEVEL 2 EA ADDTL HR: Performed by: OBSTETRICS & GYNECOLOGY

## 2018-03-06 PROCEDURE — 25000128 H RX IP 250 OP 636: Performed by: OBSTETRICS & GYNECOLOGY

## 2018-03-06 PROCEDURE — 88307 TISSUE EXAM BY PATHOLOGIST: CPT | Performed by: OBSTETRICS & GYNECOLOGY

## 2018-03-06 PROCEDURE — 76815 OB US LIMITED FETUS(S): CPT

## 2018-03-06 PROCEDURE — 36415 COLL VENOUS BLD VENIPUNCTURE: CPT | Performed by: OBSTETRICS & GYNECOLOGY

## 2018-03-06 PROCEDURE — 71000014 ZZH RECOVERY PHASE 1 LEVEL 2 FIRST HR: Performed by: OBSTETRICS & GYNECOLOGY

## 2018-03-06 PROCEDURE — 25000125 ZZHC RX 250: Performed by: NURSE ANESTHETIST, CERTIFIED REGISTERED

## 2018-03-06 PROCEDURE — 27210794 ZZH OR GENERAL SUPPLY STERILE: Performed by: OBSTETRICS & GYNECOLOGY

## 2018-03-06 PROCEDURE — 85027 COMPLETE CBC AUTOMATED: CPT | Performed by: OBSTETRICS & GYNECOLOGY

## 2018-03-06 PROCEDURE — 36000056 ZZH SURGERY LEVEL 3 1ST 30 MIN: Performed by: OBSTETRICS & GYNECOLOGY

## 2018-03-06 PROCEDURE — G0463 HOSPITAL OUTPT CLINIC VISIT: HCPCS

## 2018-03-06 PROCEDURE — 86900 BLOOD TYPING SEROLOGIC ABO: CPT | Performed by: OBSTETRICS & GYNECOLOGY

## 2018-03-06 PROCEDURE — 25000132 ZZH RX MED GY IP 250 OP 250 PS 637: Performed by: OBSTETRICS & GYNECOLOGY

## 2018-03-06 PROCEDURE — 37000008 ZZH ANESTHESIA TECHNICAL FEE, 1ST 30 MIN: Performed by: OBSTETRICS & GYNECOLOGY

## 2018-03-06 PROCEDURE — 27210995 ZZH RX 272: Performed by: OBSTETRICS & GYNECOLOGY

## 2018-03-06 RX ORDER — HYDROMORPHONE HYDROCHLORIDE 1 MG/ML
.3-.5 INJECTION, SOLUTION INTRAMUSCULAR; INTRAVENOUS; SUBCUTANEOUS EVERY 30 MIN PRN
Status: DISCONTINUED | OUTPATIENT
Start: 2018-03-06 | End: 2018-03-09 | Stop reason: HOSPADM

## 2018-03-06 RX ORDER — OXYCODONE HYDROCHLORIDE 5 MG/1
5-10 TABLET ORAL
Status: DISCONTINUED | OUTPATIENT
Start: 2018-03-06 | End: 2018-03-09 | Stop reason: HOSPADM

## 2018-03-06 RX ORDER — DIPHENHYDRAMINE HYDROCHLORIDE 50 MG/ML
25 INJECTION INTRAMUSCULAR; INTRAVENOUS EVERY 6 HOURS PRN
Status: DISCONTINUED | OUTPATIENT
Start: 2018-03-06 | End: 2018-03-09 | Stop reason: HOSPADM

## 2018-03-06 RX ORDER — ONDANSETRON 4 MG/1
4 TABLET, ORALLY DISINTEGRATING ORAL EVERY 30 MIN PRN
Status: DISCONTINUED | OUTPATIENT
Start: 2018-03-06 | End: 2018-03-06 | Stop reason: HOSPADM

## 2018-03-06 RX ORDER — NALOXONE HYDROCHLORIDE 0.4 MG/ML
.1-.4 INJECTION, SOLUTION INTRAMUSCULAR; INTRAVENOUS; SUBCUTANEOUS
Status: DISCONTINUED | OUTPATIENT
Start: 2018-03-06 | End: 2018-03-09 | Stop reason: HOSPADM

## 2018-03-06 RX ORDER — ONDANSETRON 2 MG/ML
4 INJECTION INTRAMUSCULAR; INTRAVENOUS EVERY 6 HOURS PRN
Status: DISCONTINUED | OUTPATIENT
Start: 2018-03-06 | End: 2018-03-06

## 2018-03-06 RX ORDER — CITRIC ACID/SODIUM CITRATE 334-500MG
30 SOLUTION, ORAL ORAL
Status: COMPLETED | OUTPATIENT
Start: 2018-03-06 | End: 2018-03-06

## 2018-03-06 RX ORDER — ACETAMINOPHEN 325 MG/1
975 TABLET ORAL ONCE
Status: COMPLETED | OUTPATIENT
Start: 2018-03-06 | End: 2018-03-06

## 2018-03-06 RX ORDER — OXYTOCIN/0.9 % SODIUM CHLORIDE 30/500 ML
100 PLASTIC BAG, INJECTION (ML) INTRAVENOUS CONTINUOUS
Status: DISCONTINUED | OUTPATIENT
Start: 2018-03-06 | End: 2018-03-09 | Stop reason: HOSPADM

## 2018-03-06 RX ORDER — METOCLOPRAMIDE 10 MG/1
10 TABLET ORAL EVERY 6 HOURS PRN
Status: DISCONTINUED | OUTPATIENT
Start: 2018-03-06 | End: 2018-03-06

## 2018-03-06 RX ORDER — MAGNESIUM HYDROXIDE 1200 MG/15ML
LIQUID ORAL PRN
Status: DISCONTINUED | OUTPATIENT
Start: 2018-03-06 | End: 2018-03-06

## 2018-03-06 RX ORDER — LABETALOL HYDROCHLORIDE 5 MG/ML
10 INJECTION, SOLUTION INTRAVENOUS
Status: DISCONTINUED | OUTPATIENT
Start: 2018-03-06 | End: 2018-03-06 | Stop reason: HOSPADM

## 2018-03-06 RX ORDER — HYDROCORTISONE 2.5 %
CREAM (GRAM) TOPICAL 3 TIMES DAILY PRN
Status: DISCONTINUED | OUTPATIENT
Start: 2018-03-06 | End: 2018-03-09 | Stop reason: HOSPADM

## 2018-03-06 RX ORDER — METOCLOPRAMIDE HYDROCHLORIDE 5 MG/ML
10 INJECTION INTRAMUSCULAR; INTRAVENOUS EVERY 6 HOURS PRN
Status: DISCONTINUED | OUTPATIENT
Start: 2018-03-06 | End: 2018-03-06

## 2018-03-06 RX ORDER — DIPHENHYDRAMINE HCL 25 MG
25 CAPSULE ORAL EVERY 6 HOURS PRN
Status: DISCONTINUED | OUTPATIENT
Start: 2018-03-06 | End: 2018-03-09 | Stop reason: HOSPADM

## 2018-03-06 RX ORDER — AMOXICILLIN 250 MG
1 CAPSULE ORAL 2 TIMES DAILY PRN
Status: DISCONTINUED | OUTPATIENT
Start: 2018-03-06 | End: 2018-03-09 | Stop reason: HOSPADM

## 2018-03-06 RX ORDER — METOCLOPRAMIDE HYDROCHLORIDE 5 MG/ML
10 INJECTION INTRAMUSCULAR; INTRAVENOUS EVERY 6 HOURS PRN
Status: DISCONTINUED | OUTPATIENT
Start: 2018-03-06 | End: 2018-03-09 | Stop reason: HOSPADM

## 2018-03-06 RX ORDER — DEXAMETHASONE SODIUM PHOSPHATE 4 MG/ML
4 INJECTION, SOLUTION INTRA-ARTICULAR; INTRALESIONAL; INTRAMUSCULAR; INTRAVENOUS; SOFT TISSUE
Status: DISCONTINUED | OUTPATIENT
Start: 2018-03-06 | End: 2018-03-06 | Stop reason: HOSPADM

## 2018-03-06 RX ORDER — KETOROLAC TROMETHAMINE 30 MG/ML
30 INJECTION, SOLUTION INTRAMUSCULAR; INTRAVENOUS ONCE
Status: COMPLETED | OUTPATIENT
Start: 2018-03-06 | End: 2018-03-06

## 2018-03-06 RX ORDER — PRENATAL VIT/IRON FUM/FOLIC AC 27MG-0.8MG
1 TABLET ORAL DAILY
COMMUNITY

## 2018-03-06 RX ORDER — HYDRALAZINE HYDROCHLORIDE 20 MG/ML
2.5-5 INJECTION INTRAMUSCULAR; INTRAVENOUS EVERY 10 MIN PRN
Status: DISCONTINUED | OUTPATIENT
Start: 2018-03-06 | End: 2018-03-06 | Stop reason: HOSPADM

## 2018-03-06 RX ORDER — HYDROMORPHONE HYDROCHLORIDE 1 MG/ML
.3-.5 INJECTION, SOLUTION INTRAMUSCULAR; INTRAVENOUS; SUBCUTANEOUS EVERY 5 MIN PRN
Status: DISCONTINUED | OUTPATIENT
Start: 2018-03-06 | End: 2018-03-06 | Stop reason: HOSPADM

## 2018-03-06 RX ORDER — KETOROLAC TROMETHAMINE 30 MG/ML
30 INJECTION, SOLUTION INTRAMUSCULAR; INTRAVENOUS
Status: DISCONTINUED | OUTPATIENT
Start: 2018-03-06 | End: 2018-03-06 | Stop reason: HOSPADM

## 2018-03-06 RX ORDER — OXYTOCIN 10 [USP'U]/ML
10 INJECTION, SOLUTION INTRAMUSCULAR; INTRAVENOUS
Status: DISCONTINUED | OUTPATIENT
Start: 2018-03-06 | End: 2018-03-09 | Stop reason: HOSPADM

## 2018-03-06 RX ORDER — LIDOCAINE 40 MG/G
CREAM TOPICAL
Status: DISCONTINUED | OUTPATIENT
Start: 2018-03-06 | End: 2018-03-09 | Stop reason: HOSPADM

## 2018-03-06 RX ORDER — NALOXONE HYDROCHLORIDE 0.4 MG/ML
.1-.4 INJECTION, SOLUTION INTRAMUSCULAR; INTRAVENOUS; SUBCUTANEOUS
Status: ACTIVE | OUTPATIENT
Start: 2018-03-06 | End: 2018-03-07

## 2018-03-06 RX ORDER — AMOXICILLIN 250 MG
2 CAPSULE ORAL 2 TIMES DAILY PRN
Status: DISCONTINUED | OUTPATIENT
Start: 2018-03-06 | End: 2018-03-09 | Stop reason: HOSPADM

## 2018-03-06 RX ORDER — FENTANYL CITRATE 50 UG/ML
25-50 INJECTION, SOLUTION INTRAMUSCULAR; INTRAVENOUS
Status: DISCONTINUED | OUTPATIENT
Start: 2018-03-06 | End: 2018-03-06 | Stop reason: HOSPADM

## 2018-03-06 RX ORDER — ACETAMINOPHEN 325 MG/1
650 TABLET ORAL EVERY 4 HOURS PRN
Status: DISCONTINUED | OUTPATIENT
Start: 2018-03-09 | End: 2018-03-09 | Stop reason: HOSPADM

## 2018-03-06 RX ORDER — OXYTOCIN/0.9 % SODIUM CHLORIDE 30/500 ML
340 PLASTIC BAG, INJECTION (ML) INTRAVENOUS CONTINUOUS PRN
Status: DISCONTINUED | OUTPATIENT
Start: 2018-03-06 | End: 2018-03-09 | Stop reason: HOSPADM

## 2018-03-06 RX ORDER — SODIUM CHLORIDE, SODIUM LACTATE, POTASSIUM CHLORIDE, CALCIUM CHLORIDE 600; 310; 30; 20 MG/100ML; MG/100ML; MG/100ML; MG/100ML
INJECTION, SOLUTION INTRAVENOUS CONTINUOUS
Status: DISCONTINUED | OUTPATIENT
Start: 2018-03-06 | End: 2018-03-06 | Stop reason: HOSPADM

## 2018-03-06 RX ORDER — DIMENHYDRINATE 50 MG/ML
25 INJECTION, SOLUTION INTRAMUSCULAR; INTRAVENOUS
Status: DISCONTINUED | OUTPATIENT
Start: 2018-03-06 | End: 2018-03-06 | Stop reason: HOSPADM

## 2018-03-06 RX ORDER — OXYTOCIN/0.9 % SODIUM CHLORIDE 30/500 ML
PLASTIC BAG, INJECTION (ML) INTRAVENOUS PRN
Status: DISCONTINUED | OUTPATIENT
Start: 2018-03-06 | End: 2018-03-06

## 2018-03-06 RX ORDER — SODIUM CHLORIDE, SODIUM LACTATE, POTASSIUM CHLORIDE, CALCIUM CHLORIDE 600; 310; 30; 20 MG/100ML; MG/100ML; MG/100ML; MG/100ML
INJECTION, SOLUTION INTRAVENOUS CONTINUOUS
Status: DISCONTINUED | OUTPATIENT
Start: 2018-03-06 | End: 2018-03-06

## 2018-03-06 RX ORDER — LANOLIN 100 %
OINTMENT (GRAM) TOPICAL
Status: DISCONTINUED | OUTPATIENT
Start: 2018-03-06 | End: 2018-03-09 | Stop reason: HOSPADM

## 2018-03-06 RX ORDER — SIMETHICONE 80 MG
80 TABLET,CHEWABLE ORAL 4 TIMES DAILY PRN
Status: DISCONTINUED | OUTPATIENT
Start: 2018-03-06 | End: 2018-03-09 | Stop reason: HOSPADM

## 2018-03-06 RX ORDER — CEFAZOLIN SODIUM 2 G/100ML
2 INJECTION, SOLUTION INTRAVENOUS
Status: COMPLETED | OUTPATIENT
Start: 2018-03-06 | End: 2018-03-06

## 2018-03-06 RX ORDER — IBUPROFEN 600 MG/1
600 TABLET, FILM COATED ORAL EVERY 6 HOURS
Status: DISCONTINUED | OUTPATIENT
Start: 2018-03-07 | End: 2018-03-09 | Stop reason: HOSPADM

## 2018-03-06 RX ORDER — ACETAMINOPHEN 325 MG/1
975 TABLET ORAL EVERY 8 HOURS
Status: DISPENSED | OUTPATIENT
Start: 2018-03-06 | End: 2018-03-09

## 2018-03-06 RX ORDER — KETOROLAC TROMETHAMINE 30 MG/ML
15 INJECTION, SOLUTION INTRAMUSCULAR; INTRAVENOUS EVERY 6 HOURS
Status: COMPLETED | OUTPATIENT
Start: 2018-03-07 | End: 2018-03-07

## 2018-03-06 RX ORDER — MISOPROSTOL 200 UG/1
400 TABLET ORAL
Status: DISCONTINUED | OUTPATIENT
Start: 2018-03-06 | End: 2018-03-09 | Stop reason: HOSPADM

## 2018-03-06 RX ORDER — OXYTOCIN/0.9 % SODIUM CHLORIDE 30/500 ML
PLASTIC BAG, INJECTION (ML) INTRAVENOUS
Status: DISCONTINUED
Start: 2018-03-06 | End: 2018-03-06 | Stop reason: HOSPADM

## 2018-03-06 RX ORDER — PROCHLORPERAZINE 25 MG
25 SUPPOSITORY, RECTAL RECTAL EVERY 12 HOURS PRN
Status: DISCONTINUED | OUTPATIENT
Start: 2018-03-06 | End: 2018-03-09 | Stop reason: HOSPADM

## 2018-03-06 RX ORDER — BISACODYL 10 MG
10 SUPPOSITORY, RECTAL RECTAL DAILY PRN
Status: DISCONTINUED | OUTPATIENT
Start: 2018-03-08 | End: 2018-03-09 | Stop reason: HOSPADM

## 2018-03-06 RX ORDER — ONDANSETRON 2 MG/ML
4 INJECTION INTRAMUSCULAR; INTRAVENOUS EVERY 6 HOURS PRN
Status: DISCONTINUED | OUTPATIENT
Start: 2018-03-06 | End: 2018-03-09 | Stop reason: HOSPADM

## 2018-03-06 RX ORDER — ONDANSETRON 2 MG/ML
4 INJECTION INTRAMUSCULAR; INTRAVENOUS EVERY 30 MIN PRN
Status: DISCONTINUED | OUTPATIENT
Start: 2018-03-06 | End: 2018-03-06 | Stop reason: HOSPADM

## 2018-03-06 RX ORDER — DEXTROSE MONOHYDRATE, SODIUM CHLORIDE, AND POTASSIUM CHLORIDE 50; 1.49; 4.5 G/1000ML; G/1000ML; G/1000ML
INJECTION, SOLUTION INTRAVENOUS CONTINUOUS
Status: DISCONTINUED | OUTPATIENT
Start: 2018-03-06 | End: 2018-03-09 | Stop reason: HOSPADM

## 2018-03-06 RX ORDER — MEPERIDINE HYDROCHLORIDE 50 MG/ML
12.5 INJECTION INTRAMUSCULAR; INTRAVENOUS; SUBCUTANEOUS EVERY 5 MIN PRN
Status: DISCONTINUED | OUTPATIENT
Start: 2018-03-06 | End: 2018-03-06 | Stop reason: HOSPADM

## 2018-03-06 RX ADMIN — OXYTOCIN-SODIUM CHLORIDE 0.9% IV SOLN 30 UNIT/500ML 500 ML: 30-0.9/5 SOLUTION at 12:10

## 2018-03-06 RX ADMIN — Medication 0.5 MG: at 17:58

## 2018-03-06 RX ADMIN — SODIUM CHLORIDE 1000 ML: 900 IRRIGANT IRRIGATION at 12:30

## 2018-03-06 RX ADMIN — PHENYLEPHRINE HYDROCHLORIDE 100 MCG: 10 INJECTION, SOLUTION INTRAMUSCULAR; INTRAVENOUS; SUBCUTANEOUS at 12:12

## 2018-03-06 RX ADMIN — OXYTOCIN-SODIUM CHLORIDE 0.9% IV SOLN 30 UNIT/500ML 100 ML/HR: 30-0.9/5 SOLUTION at 14:45

## 2018-03-06 RX ADMIN — PHENYLEPHRINE HYDROCHLORIDE 100 MCG: 10 INJECTION, SOLUTION INTRAMUSCULAR; INTRAVENOUS; SUBCUTANEOUS at 11:52

## 2018-03-06 RX ADMIN — SODIUM CITRATE AND CITRIC ACID MONOHYDRATE 30 ML: 500; 334 SOLUTION ORAL at 11:02

## 2018-03-06 RX ADMIN — CEFAZOLIN SODIUM 2 G: 2 INJECTION, SOLUTION INTRAVENOUS at 11:27

## 2018-03-06 RX ADMIN — POTASSIUM CHLORIDE, DEXTROSE MONOHYDRATE AND SODIUM CHLORIDE: 150; 5; 450 INJECTION, SOLUTION INTRAVENOUS at 19:33

## 2018-03-06 RX ADMIN — ACETAMINOPHEN 975 MG: 325 TABLET, FILM COATED ORAL at 11:02

## 2018-03-06 RX ADMIN — KETOROLAC TROMETHAMINE 30 MG: 30 INJECTION, SOLUTION INTRAMUSCULAR at 19:32

## 2018-03-06 RX ADMIN — PHENYLEPHRINE HYDROCHLORIDE 150 MCG: 10 INJECTION, SOLUTION INTRAMUSCULAR; INTRAVENOUS; SUBCUTANEOUS at 11:50

## 2018-03-06 RX ADMIN — SODIUM CHLORIDE, POTASSIUM CHLORIDE, SODIUM LACTATE AND CALCIUM CHLORIDE: 600; 310; 30; 20 INJECTION, SOLUTION INTRAVENOUS at 09:47

## 2018-03-06 RX ADMIN — ACETAMINOPHEN 975 MG: 325 TABLET, FILM COATED ORAL at 19:32

## 2018-03-06 RX ADMIN — Medication 0.5 MG: at 14:45

## 2018-03-06 RX ADMIN — ONDANSETRON 4 MG: 2 INJECTION INTRAMUSCULAR; INTRAVENOUS at 11:48

## 2018-03-06 RX ADMIN — CEFAZOLIN SODIUM 1 G: 2 INJECTION, SOLUTION INTRAVENOUS at 12:13

## 2018-03-06 RX ADMIN — SODIUM CHLORIDE, POTASSIUM CHLORIDE, SODIUM LACTATE AND CALCIUM CHLORIDE: 600; 310; 30; 20 INJECTION, SOLUTION INTRAVENOUS at 11:26

## 2018-03-06 RX ADMIN — SODIUM CHLORIDE, POTASSIUM CHLORIDE, SODIUM LACTATE AND CALCIUM CHLORIDE: 600; 310; 30; 20 INJECTION, SOLUTION INTRAVENOUS at 11:20

## 2018-03-06 RX ADMIN — SODIUM CHLORIDE, POTASSIUM CHLORIDE, SODIUM LACTATE AND CALCIUM CHLORIDE: 600; 310; 30; 20 INJECTION, SOLUTION INTRAVENOUS at 12:30

## 2018-03-06 RX ADMIN — SODIUM CHLORIDE, POTASSIUM CHLORIDE, SODIUM LACTATE AND CALCIUM CHLORIDE 1000 ML: 600; 310; 30; 20 INJECTION, SOLUTION INTRAVENOUS at 10:54

## 2018-03-06 ASSESSMENT — LIFESTYLE VARIABLES: TOBACCO_USE: 0

## 2018-03-06 NOTE — IP AVS SNAPSHOT
MRN:[id number]                      After Visit Summary   3/6/2018    Cheryl Coles    MRN: [id number]           Thank you!     Thank you for choosing [facility] for your care. Our goal is always to provide you with excellent care. Hearing back from our patients is one way we can continue to improve our services. Please take a few minutes to complete the written survey that you may receive in the mail after you visit. If you would like to speak to someone directly about your visit please contact Patient Relations at [phone]. Thank you!          Patient Information     Date Of Birth          1985        Designated Caregiver       Most Recent Value    Caregiver    Will someone help with your care after discharge? no      About your hospital stay     You were admitted on:  2018 You last received care in the:  [facility]    You were discharged on:  2018        Reason for your hospital stay       Maternity care                  Who to Call     For medical emergencies, please call 911.  For non-urgent questions about your medical care, please call your primary care provider or clinic, [phone]  For questions related to your surgery, please call your surgery clinic        Attending Provider     Provider Specialty    Manny Gautam MD OB/Gyn       Primary Care Provider Office Phone # Fax #    [facility] [phone]      After Care Instructions     Activity       Review discharge instructions            Diet       Resume previous diet            Discharge Instructions - Postpartum visit       Schedule postpartum visit with your provider and return to clinic in 6 weeks.                  Further instructions from your care team       LACTATION: [phone]       Postop  Birth Instructions    Activity       Do not lift more than 10 pounds for 6 weeks after surgery.  Ask family and friends for help when you need  it.    No driving until you have stopped taking your pain medications (usually two weeks after surgery).    No heavy exercise or activity for 6 weeks.  Don't do anything that will put a strain on your surgery site.    Don't strain when using the toilet.  Your care team may prescribe a stool softener if you have problems with your bowel movements.     To care for your incision:       Keep the incision clean and dry.    Do not soak your incision in water. No swimming or hot tubs until it has fully healed. You may soak in the bathtub if the water level is below your incision.    Do not use peroxide, gel, cream, lotion, or ointment on your incision.    Adjust your clothes to avoid pressure on your surgery site (check the elastic in your underwear for example).     You may see a small amount of clear or pink drainage and this is normal.  Check with your health care provider:       If the drainage increases or has an odor.    If the incision reddens, you have swelling, or develop a rash.    If you have increased pain and the medicine we prescribed doesn't help.    If you have a fever above 100.4 F (38 C) with or without chills when placing thermometer under your tongue.   The area around your incision (surgery wound), will feel numb.  This is normal. The numbness should go away in less than a year.     Keep your hands clean:  Always wash your hands before touching your incision (surgery wound). This helps reduce your risk of infection. If your hands aren't dirty, you may use an alcohol hand-rub to clean your hands. Keep your nails clean and short.    Call your healthcare provider if you have any of these symptoms:       You soak a sanitary pad with blood within 1 hour, or you see blood clots larger than a golf ball.    Bleeding that lasts more than 6 weeks.    Vaginal discharge that smells bad.    Severe pain, cramping or tenderness in your lower belly area.    A need to urinate more frequently (use the toilet more  "often), more urgently (use the toilet very quickly), or it burns when you urinate.    Nausea and vomiting.    Redness, swelling or pain around a vein in your leg.    Problems breastfeeding or a red or painful area on your breast.    Chest pain and cough or are gasping for air.    Problems with coping with sadness, anxiety or depression. If you have concerns about hurting yourself or the baby, call your provider immediately.      You have questions or concerns after you return home.                  Pending Results     No orders found from 3/4/2018 to 3/7/2018.            Statement of Approval     Ordered          18 0827  I have reviewed and agree with all the recommendations and orders detailed in this document.  EFFECTIVE NOW     Approved and electronically signed by:  Mora Parnell DO             Admission Information     Date & Time Provider Department Dept. Phone    3/6/2018 Manny Gautam MD [facility] [phone]      Your Vitals Were     Blood Pressure Pulse Temperature Respirations Height Weight    106/68 76 97.8  F (36.6  C) (Oral) 16 1.626 m (5' 4\") 86.2 kg (190 lb)    Last Period Pulse Oximetry BMI (Body Mass Index)             2017 (Exact Date) 99% 32.61 kg/m2         MyChart Information     [employer] lets you send messages to your doctor, view your test results, renew your prescriptions, schedule appointments and more. To sign up, go to [email or web address] . Click on \"Log in\" on the left side of the screen, which will take you to the Welcome page. Then click on \"Sign up Now\" on the right side of the page.     You will be asked to enter the access code listed below, as well as some personal information. Please follow the directions to create your username and password.     Your access code is: [email or web address]  Expires: 2018 10:52 AM     Your access code will  in 90 days. If you need help or a new code, please call your [facility] or [phone].      "   Care EveryWhere ID     This is your Care EveryWhere ID. This could be used by other organizations to access your [place] medical records  [id number]        Equal Access to Services     SADIA CHANG : Akhil Santoro, hilton vera, jeanettealice trammelllaurent judd, manisha virgen lakarolinaisa carlos. [facility] [phone].    ATENCIÓN: Si habla español, tiene a siddiqui disposición servicios gratuitos de asistencia lingüística. Llame al [phone].    We comply with applicable federal civil rights laws and [place] laws. We do not discriminate on the basis of race, color, national origin, age, disability, sex, sexual orientation, or gender identity.               Review of your medicines      START taking        Dose / Directions    breast pump Misc   Used for:  Breast feeding status of mother        Dose:  1 each   1 each as needed   Quantity:  1 each   Refills:  0       ibuprofen 600 MG tablet   Commonly known as:  ADVIL/MOTRIN        Dose:  600 mg   Take 1 tablet (600 mg) by mouth every 6 hours   Quantity:  60 tablet   Refills:  0       oxyCODONE IR 5 MG tablet   Commonly known as:  ROXICODONE        Dose:  5-10 mg   Take 1-2 tablets (5-10 mg) by mouth every 3 hours as needed for other (pain control or improvement in physical function. Hold dose for analgesic side effects.)   Quantity:  30 tablet   Refills:  0       senna-docusate 8.6-50 MG per tablet   Commonly known as:  SENOKOT-S;PERICOLACE        Dose:  1-2 tablet   Take 1-2 tablets by mouth daily as needed for constipation   Quantity:  60 tablet   Refills:  0         CONTINUE these medicines which may have CHANGED, or have new prescriptions. If we are uncertain of the size of tablets/capsules you have at home, strength may be listed as something that might have changed.        Dose / Directions    ferrous sulfate 325 (65 FE) MG tablet   Commonly known as:  IRON SUPPLEMENT   This may have changed:    - medication strength  - how much to  take  - when to take this        Dose:  325 mg   Take 1 tablet (325 mg) by mouth 2 times daily   Quantity:  60 tablet   Refills:  1         CONTINUE these medicines which have NOT CHANGED        Dose / Directions    prenatal multivitamin plus iron 27-0.8 MG Tabs per tablet        Dose:  1 tablet   Take 1 tablet by mouth daily   Refills:  0            Where to get your medicines      Some of these will need a paper prescription and others can be bought over the counter. Ask your nurse if you have questions.     Bring a paper prescription for each of these medications     breast pump Misc    ferrous sulfate 325 (65 FE) MG tablet    ibuprofen 600 MG tablet    oxyCODONE IR 5 MG tablet    senna-docusate 8.6-50 MG per tablet                Protect others around you: Learn how to safely use, store and throw away your medicines at www.disposemymeds.org.        Information about OPIOIDS     PRESCRIPTION OPIOIDS: WHAT YOU NEED TO KNOW    Prescription opioids can be used to help relieve moderate to severe pain and are often prescribed following a surgery or injury, or for certain health conditions. These medications can be an important part of treatment but also come with serious risks. It is important to work with your health care provider to make sure you are getting the safest, most effective care.    WHAT ARE THE RISKS AND SIDE EFFECTS OF OPIOID USE?  Prescription opioids carry serious risks of addiction and overdose, especially with prolonged use. An opioid overdose, often marked by slowed breathing can cause sudden death. The use of prescription opioids can have a number of side effects as well, even when taken as directed:      Tolerance - meaning you might need to take more of a medication for the same pain relief    Physical dependence - meaning you have symptoms of withdrawal when a medication is stopped    Increased sensitivity to pain    Constipation    Nausea, vomiting, and dry mouth    Sleepiness and  dizziness    Confusion    Depression    Low levels of testosterone that can result in lower sex drive, energy, and strength    Itching and sweating    RISKS ARE GREATER WITH:    History of drug misuse, substance use disorder, or overdose    Mental health conditions (such as depression or anxiety)    Sleep apnea    Older age (65 years or older)    Pregnancy    Avoid alcohol while taking prescription opioids.   Also, unless specifically advised by your health care provider, medications to avoid include:    Benzodiazepines (such as Xanax or Valium)    Muscle relaxants (such as Soma or Flexeril)    Hypnotics (such as Ambien or Lunesta)    Other prescription opioids    KNOW YOUR OPTIONS:  Talk to your health care provider about ways to manage your pain that do not involve prescription opioids. Some of these options may actually work better and have fewer risks and side effects:    Pain relievers such as acetaminophen, ibuprofen, and naproxen    Some medications that are also used for depression or seizures    Physical therapy and exercise    Cognitive behavioral therapy, a psychological, goal-directed approach, in which patients learn how to modify physical, behavioral, and emotional triggers of pain and stress    IF YOU ARE PRESCRIBED OPIOIDS FOR PAIN:    Never take opioids in greater amounts or more often than prescribed    Follow up with your primary health care provider and work together to create a plan on how to manage your pain.    Talk about ways to help manage your pain that do not involve prescription opioids    Talk about all concerns and side effects    Help prevent misuse and abuse    Never sell or share prescription opioids    Never use another person's prescription opioids    Store prescription opioids in a secure place and out of reach of others (this may include visitors, children, friends, and family)    Visit www.cdc.gov/drugoverdose to learn about risks of opioid abuse and overdose    If you believe  you may be struggling with addiction, tell your health care provider and ask for guidance or call [facility]'s National Helpline at 5-193-210-HELP    LEARN MORE / www.cdc.gov/drugoverdose/prescribing/guideline.html    Safely dispose of unused prescription opioids: Find your local drug take-back programs and more information about the importance of safe disposal at www.doseofreality.[place].gov             Medication List: This is a list of all your medications and when to take them. Check marks below indicate your daily home schedule. Keep this list as a reference.      Medications           Morning Afternoon Evening Bedtime As Needed    breast pump Misc   1 each as needed                                ferrous sulfate 325 (65 FE) MG tablet   Commonly known as:  IRON SUPPLEMENT   Take 1 tablet (325 mg) by mouth 2 times daily   Last time this was given:  325 mg on 3/9/2018 10:08 AM                                ibuprofen 600 MG tablet   Commonly known as:  ADVIL/MOTRIN   Take 1 tablet (600 mg) by mouth every 6 hours   Last time this was given:  600 mg on 3/9/2018 10:08 AM                                oxyCODONE IR 5 MG tablet   Commonly known as:  ROXICODONE   Take 1-2 tablets (5-10 mg) by mouth every 3 hours as needed for other (pain control or improvement in physical function. Hold dose for analgesic side effects.)   Last time this was given:  5 mg on 3/8/2018 12:44 AM                                prenatal multivitamin plus iron 27-0.8 MG Tabs per tablet   Take 1 tablet by mouth daily                                senna-docusate 8.6-50 MG per tablet   Commonly known as:  SENOKOT-S;PERICOLACE   Take 1-2 tablets by mouth daily as needed for constipation   Last time this was given:  1 tablet on 3/8/2018  9:16 PM

## 2018-03-06 NOTE — ANESTHESIA POSTPROCEDURE EVALUATION
Patient: Cheryl Coles    Procedure(s):   - Wound Class: II-Clean Contaminated    Diagnosis: section  Diagnosis Additional Information: Placental abruption    Anesthesia Type:  Spinal    Note:  Anesthesia Post Evaluation    Patient location during evaluation: PACU  Patient participation: Able to fully participate in evaluation  Level of consciousness: awake and alert  Pain management: adequate  Airway patency: patent  Cardiovascular status: acceptable  Respiratory status: acceptable  Hydration status: acceptable  PONV: controlled     Anesthetic complications: None          Last vitals:  Vitals:    18 1256 18 1300 18 1305   BP: 101/56 94/51 95/53   Pulse:      Resp:  18 16   Temp:  97.9  F (36.6  C)    SpO2:  97% 98%         Electronically Signed By: Jenaro Chavis MD  2018  1:17 PM

## 2018-03-06 NOTE — PROVIDER NOTIFICATION
18 1055   Provider Notification   Provider Name/Title Dr Gamez   Method of Notification At Bedside   Request Evaluate in Person   Notification Reason Lab/Diagnostic Study   Dr gamez at bedside discussing POC with pt and . Will go for  section for placenta abruption.

## 2018-03-06 NOTE — ANESTHESIA CARE TRANSFER NOTE
Patient: Cheryl Coles    Procedure(s):   - Wound Class: II-Clean Contaminated    Diagnosis:  section  Diagnosis Additional Information: No value filed.    Anesthesia Type:   Spinal     Note:  Airway :Room Air  Patient transferred to:Labor and Delivery  Comments: VSS.  Spontaneously breathing room air.  Report given to RN.Handoff Report: Identifed the Patient, Identified the Reponsible Provider, Reviewed the pertinent medical history, Discussed the surgical course, Reviewed Intra-OP anesthesia mangement and issues during anesthesia, Set expectations for post-procedure period and Allowed opportunity for questions and acknowledgement of understanding      Vitals: (Last set prior to Anesthesia Care Transfer)    CRNA VITALS  3/6/2018 1223 - 3/6/2018 1257      3/6/2018             Pulse: 93    SpO2: 100 %                Electronically Signed By: RICK Valdez CRNA  2018  12:57 PM

## 2018-03-06 NOTE — PLAN OF CARE
Dilaudid given for pain. Tolerating clear liquids.   at bedside, very supportive. Baby skin to skin for much of recovery.

## 2018-03-06 NOTE — PROVIDER NOTIFICATION
"   18 0920   Provider Notification   Provider Name/Title Dr Gautam   Method of Notification At Bedside   Request Evaluate in Person   Notification Reason Patient Arrived;Membrane Status;Bleeding;SVE   Data: Patient presented to [facility]: 3/6/2018  8:14 AM.  Reason for maternal/fetal assessment is vaginal bleeding. Patient reports \"gush of blood this morning and then water leaking\" blood seen at perineum, bright red. No leakinf watery fluid seen.  Patient is a .  Prenatal record reviewed. Pregnancy has been uncomplicated..  Gestational Age 35w2d. VSS. Fetal movement present. Patient denies abdominal pain, vomiting, headache, visual disturbances, epigastric or URQ pain, significant edema. Support person is present.   Action: Verbal consent for EFM. Triage assessment completed. Bill of rights reviewed.  Response: Patient verbalized agreement with plan. Dr Gautam at bedside for SVE, unable to reach cervix. Labs and ultra sound ordered. Will place IV .    "

## 2018-03-06 NOTE — PLAN OF CARE
Dr Gautam on unit. Aware that bedside ultra sound complete. Waiting on results. Updated MD that pt continues to have vaginal bleeding and on lab rfesults.

## 2018-03-06 NOTE — OP NOTE
Operative Note    Pre-op Diagnosis:  Intrauterine Pregnancy at 35 25/7wks, Placenta abruption, Oligohydramnios, Transverse lie    Post-op Diagnosis:  Same,  live male infant delivered    Procedure: Primary Low Transverse  Section    Surgeon:  Manny Gautam MD  Assistant:  Justyna Muijca MD  Anesthesia:  Spinal  Fluids:  1000 cc LR, Ancef 2gm pre-op, Pitocin after cord clamp  EBL:  1200 cc  Drains:  Cobos - clear yellow urine during the case    Indications for Procedure:  Patient is a 32 year old year old  with an intrauterine pregnancy at 35 2/7 weeks who presented with moderate vaginal bleeding.  She is also ovi.  Her hemoglobin was 9.8.  Ultrasound did not show an abruption overtly, however there was a possible clot near the cervix, and there was oligohydramnios as well as transverse lie.  Therefore she is to undergo primary  section for likely placenta abruption based on clinical findings.  She understands risks, benefits, and alternatives to the above and has given informed consent.      Findings:  Term live male infant, Back-down transverse lie with vertex in maternal left upper quadrant and ultimately delivered breech, 6# 7oz. (2910 g), Apgars 8(1min) 9(5min), amniotic fluid-Clear, 3v cord, No loop nuchal cord, large clot and red blood on entry into the uterine cavity consistent with abruption, fundal placenta, normal uterus.    Specimens:  Placenta, Cord gases pending    Procedure in detail:  After proper patient identification and informed consent was obtained, the patient was taken to the operating room where adequate Spinal anesthesia was obtained.  Patient was placed in dorsal supine position with leftward tilt.  Cobos catheter was placed.  Patient was prepped and draped in usual sterile manner for this procedure.  Dr. Mujica was required to act as first assistant to assist with retraction.    A Pfannenstiel skin incision was made with a knife and carried down  through the underlying subcutaneous fat to the anterior rectus fascia.  Bleeders were cauterized along the way.  The anterior rectus fascia was nicked transversely with a knife and the incision was extended bilaterally in a semilunar fashion with curved Bragg scissors.  The superior and inferior edges of the fascial incision were tented with Kocher clamps and sharply dissected from the underlying rectus muscle bellies.  After this was performed, the peritoneal incision was extended superiorly and inferiorly avoiding the bladder.  Bladder blade was placed.  Vesicouterine peritoneum overlying the lower uterine segment was incised with a Metzenbaum scissors and the incision was extended bilaterally and the bladder flap was created with sharp dissection.  The bladder blade was placed to retract the bladder flap out of harm's way.    The lower uterine segment was then scored transversely with a knife superficially and then the incision was carried down in the central portion of the score line until the amniotic space was reached.  There was a large clot and several hundred ccs of red blood in the lower uterine cavity on entry.  Amniotic fluid was clear however on amniotomy.  Blunt traction was applied in a craniocaudad manner, extended the incision along the score line.  The baby's position was palpated as back-down transverse with head in left upper quadrant.  The position was rotated to breech and the feet were sequentially identified and brought down through the incision.  The breech was atraumatically delivered as it was lifted up through the incision with moderate fundal pressure until the level of the umbilicus.  The infant was then delivered to the level of the scapula.  The infant was rotated counterclockwise and the right arm was flexed and delivered.  The infant was rotated clockwise, and the left arm was flexed and delivered.  The head was delivered using a modified Mauriceau maneuver atraumatically.  The  oropharynx and nares were bulb suctioned on the operative field.  A 3v cord that had no nuchal loop was noted and was doubly clamped and cut and the infant was handed off to the NICU staff in attendance.  Cord blood was obtained.  The patient received IV Pitocin.  She received Ancef preoperatively.  Placenta was delivered spontaneously with gentle cord traction and noted to be otherwise normal and intact.  The uterine cavity was cleared of all clots and debris.  The uterine incision was closed using a running locking #1 chromic gut suture and then followed by an imbricating suture of #1 chromic gut in a modified Lembert stitch.  The uterine incision was hemostatic at this point.    The pericolic gutters were copiously irrigated with warm normal saline and cleared of any clots and debris.  Reinspection of all operative sites confirmed good hemostasis.    The rectus muscle bellies were then loosely reapproximated in the midline using interrupted simple 2-0 Vicryl sutures to try to reduce the likelihood of diastasis.  The subfascial space was copiously irrigated with warm normal saline and any remaining bleeders were cauterized.  The anterior rectus fascia was closed using running 0 PDS suture.  The subcutaneous layer was copiously irrigated with warm normal saline and any remaining bleeders were cauterized.  Subcutaneous layer was reapproximated using running 2-0 Monocryl sutures to close the dead space.  Skin was closed using running 4-0 Vicryl subcuticular suture.  Steri-Strips were placed.    The patient tolerated the procedure well.  Sponge, instrument and needle counts were correct x3.  The patient was taken to the recovery room in stable condition.      LEONELA KINGSTON MD

## 2018-03-06 NOTE — ANESTHESIA PROCEDURE NOTES
Peripheral nerve/Neuraxial procedure note : intrathecal  Pre-Procedure  Performed by ZULAY LAST  Location: OR      Pre-Anesthestic Checklist: patient identified, IV checked, site marked, risks and benefits discussed, informed consent, monitors and equipment checked and pre-op evaluation    Timeout  Correct Patient: Yes   Correct Procedure: Yes   Correct Site: Yes   Correct Laterality: Yes   Correct Position: Yes   Site Marked: Yes   .   Procedure Documentation  ASA 2  .    Procedure:    Intrathecal.  Insertion Site:L4-5  (midline approach)      Patient Prep;mask, sterile gloves, povidone-iodine 7.5% surgical scrub.  .  Needle: Sprotte Spinal Needle (gauge): 24  Spinal/LP Needle Length (inches): 3 # of attempts: 1 and # of redirects:  Introducer used Introducer: 20 G .       Assessment/Narrative  Paresthesias: No.  .  .  clear CSF fluid removed . Time Injected: 11:42  Comments:  11mg of 0.75% bupivicaine + 250 mcg of duramorph + epi wash injected into the IT space.

## 2018-03-06 NOTE — H&P
"  2018    Cheryl Coles  [id number]            OB Admit History & Physical      Ms. Coles  is here for VB.    She has noticed bright red VB running down her leg starting this AM.  Fetus active.  Also started having some UCs that are mild.  Upon arrival to L&D, she is actively having red VB.  Unable to assess for SROM due to bleeding.    Patient's last menstrual period was 2017 (exact date).   Her Estimated Date of Delivery: 2018, making her 35w2d.      Estimated body mass index is 32.61 kg/(m^2) as calculated from the following:    Height as of this encounter: 1.626 m (5' 4\").    Weight as of this encounter: 86.2 kg (190 lb).  Her prenatal course has been w/ Dr. Villalpando and essentially benign thus far.  An U/S done 17 showed placenta to be posterior but 5 cm away from os.    See prenatal for labs.  Unknown GBS, Rubella Immune, RH Positive         She is a 32 year old   Her OB history:   Obstetric History       T3      L0     SAB0   TAB0   Ectopic0   Multiple0   Live Births0       # Outcome Date GA Lbr Navin/2nd Weight Sex Delivery Anes PTL Lv   7 Current            6             5 AB            4 AB            3 Term            2 Term            1 Term                        Past Medical History:   Diagnosis Date     Antepartum hemorrhage in third trimester, antepartum 3/6/2018        No past surgical history on file.      No current outpatient prescriptions on file.       Allergies: Review of patient's allergies indicates no known allergies.      REVIEW OF SYSTEMS:  NEUROLOGIC:  Negative  EYES:  Negative  ENT:  Negative  GI:  Negative  :  Negative  GYN:  Negative  CV:  Negative  PULMONARY:  Negative  MUSCULOSKELETAL:  Negative  PSYCH:  Negative        Social History     Social History     Marital status:      Spouse name: N/A     Number of children: N/A     Years of education: N/A     Occupational History     Not on file.     Social History Main Topics     " "Smoking status: Never Smoker     Smokeless tobacco: Never Used     Alcohol use No     Drug use: No     Sexual activity: Not on file     Other Topics Concern     Not on file     Social History Narrative      No family history on file.      Exam:    Vitals:   /58  Pulse 109  Temp 98.5  F (36.9  C) (Oral)  Resp 18  Ht 1.626 m (5' 4\")  Wt 86.2 kg (190 lb)  LMP 06/25/2017 (Exact Date)  BMI 32.61 kg/m2  FHT: Reactive    [place]:  Mild contractions every  Q 10 min    Alert Awake in NAD  HEENT grossly normal  Neck: no lymphadenopathy or thryoidomegaly  Lungs CTAB  Back No CVAT  Heart RR  ABD gravid, NABS, soft, NT on exam with NT fundus palpable  Perineum - covered in blood and active vaginal flow of blood present  Cervix is unable to be assessed due to no presenting part palpated and Cx is very high  EXT:  No edema, no calf tenderness    Labs:  Hgb 9.8  Plts 310  A Pos    U/S showed ash, transverse lie, placenta is fundal but ? succenturiate lobe vs clot near Cx.  Abruption not felt to be present. JACQUES 6.4 c/w oligo.    Assessment:    1)  IUP at 35w2d    2)  Third trimester bleeding - I suspect placental abruption due to amount of VB which is significant despite what U/S suggests, especially in setting of oligohydramnios and contractions.    3)  A Pos    4)  Anemia - possibly due to acute blood loss vs Fe def.      Plan:    1)  I d/w Pt likely Dx of abruption despite U/S, and at the least possible SROM that cannot be confirmed due to inability to do fern or Amnisure due to active VB.  Given transverse lie, will require C/S now.    2)  She understands the indications/risks/benefits/alternatives and has given informed consent.        Manny Gautam MD  Dept of OB/GYN  March 6, 2018     "

## 2018-03-06 NOTE — ANESTHESIA PREPROCEDURE EVALUATION
PAC NOTE:       ANESTHESIA PRE EVALUATION:  Anesthesia Evaluation     .             ROS/MED HX    ENT/Pulmonary:  - neg pulmonary ROS    (-) tobacco use   Neurologic:  - neg neurologic ROS     Cardiovascular:  - neg cardiovascular ROS       METS/Exercise Tolerance:     Hematologic:  - neg hematologic  ROS       Musculoskeletal:  - neg musculoskeletal ROS       GI/Hepatic:  - neg GI/hepatic ROS       Renal/Genitourinary:  - ROS Renal section negative       Endo:     (+) Obesity, Other Endocrine Disorder abruption.      Psychiatric:  - neg psychiatric ROS       Infectious Disease:  - neg infectious disease ROS       Malignancy:      - no malignancy   Other:    (+) Possibly pregnant C-spine cleared: N/A, no H/O Chronic Pain,no other significant disability                    Physical Exam  Normal systems: cardiovascular, pulmonary and dental    Airway   Mallampati: II  TM distance: >3 FB  Neck ROM: full    Dental     Cardiovascular       Pulmonary              Anesthesia Plan      History & Physical Review  History and physical reviewed and following examination; no interval change.    ASA Status:  2 .    NPO Status:  > 8 hours    Plan for Spinal with Other induction. Maintenance will be Other.    PONV prophylaxis:  Ondansetron (or other 5HT-3) and Dexamethasone or Solumedrol       Postoperative Care      Consents  Anesthetic plan, risks, benefits and alternatives discussed with:  Patient.  Use of blood products discussed: Yes.   Use of blood products discussed with Patient.  Consented to blood products.  .                            .

## 2018-03-06 NOTE — IP AVS SNAPSHOT
[facility]    [address]    [facility] [address]    Phone:  [phone]    Fax:  [phone]                                       After Visit Summary   3/6/2018    Cheryl Coles    MRN: [id number]           After Visit Summary Signature Page     I have received my discharge instructions, and my questions have been answered. I have discussed any challenges I see with this plan with the nurse or doctor.    ..........................................................................................................................................  Patient/Patient Representative Signature      ..........................................................................................................................................  Patient Representative Print Name and Relationship to Patient    ..................................................               ................................................  Date                                            Time    ..........................................................................................................................................  Reviewed by Signature/Title    ...................................................              ..............................................  Date                                                            Time

## 2018-03-07 LAB
COPATH REPORT: NORMAL
HGB BLD-MCNC: 8.3 G/DL (ref 11.7–15.7)

## 2018-03-07 PROCEDURE — 85018 HEMOGLOBIN: CPT | Performed by: OBSTETRICS & GYNECOLOGY

## 2018-03-07 PROCEDURE — 25000132 ZZH RX MED GY IP 250 OP 250 PS 637: Performed by: OBSTETRICS & GYNECOLOGY

## 2018-03-07 PROCEDURE — 36415 COLL VENOUS BLD VENIPUNCTURE: CPT | Performed by: OBSTETRICS & GYNECOLOGY

## 2018-03-07 PROCEDURE — 25800025 ZZH RX 258: Performed by: OBSTETRICS & GYNECOLOGY

## 2018-03-07 PROCEDURE — 12000027 ZZH R&B OB

## 2018-03-07 PROCEDURE — 40000083 ZZH STATISTIC IP LACTATION SERVICES 1-15 MIN

## 2018-03-07 PROCEDURE — 25000128 H RX IP 250 OP 636: Performed by: OBSTETRICS & GYNECOLOGY

## 2018-03-07 RX ORDER — FERROUS SULFATE 325(65) MG
325 TABLET ORAL DAILY
Status: DISCONTINUED | OUTPATIENT
Start: 2018-03-08 | End: 2018-03-09 | Stop reason: HOSPADM

## 2018-03-07 RX ADMIN — OXYCODONE HYDROCHLORIDE 5 MG: 5 TABLET ORAL at 17:46

## 2018-03-07 RX ADMIN — ACETAMINOPHEN 975 MG: 325 TABLET, FILM COATED ORAL at 03:29

## 2018-03-07 RX ADMIN — KETOROLAC TROMETHAMINE 15 MG: 30 INJECTION, SOLUTION INTRAMUSCULAR at 01:28

## 2018-03-07 RX ADMIN — SENNOSIDES AND DOCUSATE SODIUM 2 TABLET: 8.6; 5 TABLET ORAL at 21:09

## 2018-03-07 RX ADMIN — ACETAMINOPHEN 975 MG: 325 TABLET, FILM COATED ORAL at 11:27

## 2018-03-07 RX ADMIN — POTASSIUM CHLORIDE, DEXTROSE MONOHYDRATE AND SODIUM CHLORIDE: 150; 5; 450 INJECTION, SOLUTION INTRAVENOUS at 03:21

## 2018-03-07 RX ADMIN — KETOROLAC TROMETHAMINE 15 MG: 30 INJECTION, SOLUTION INTRAMUSCULAR at 13:32

## 2018-03-07 RX ADMIN — ACETAMINOPHEN 975 MG: 325 TABLET, FILM COATED ORAL at 21:08

## 2018-03-07 RX ADMIN — SENNOSIDES AND DOCUSATE SODIUM 1 TABLET: 8.6; 5 TABLET ORAL at 07:56

## 2018-03-07 RX ADMIN — KETOROLAC TROMETHAMINE 15 MG: 30 INJECTION, SOLUTION INTRAMUSCULAR at 07:56

## 2018-03-07 RX ADMIN — IBUPROFEN 600 MG: 600 TABLET ORAL at 21:08

## 2018-03-07 NOTE — LACTATION NOTE
Lactation visit. Mom is pumping for baby in the NICU at 35 2/7 weeks. She reports no milk yet. Explained anatomy of milk production and enc to continue q 3 hours pumping. Discussed the value of hand expression after pumping. Had Mom view the hand expression video and encouraged her to add hand expression after each pumping to increase milk supply. Demonstrated the technique and mom returned demo successfully.

## 2018-03-07 NOTE — PROGRESS NOTES
[facility] Obstetrics Post-Op / Progress Note    Interval History   Doing well.  Pain is well-controlled.  No fevers.  No history of wound drainage, warmth or significant erythema.  Good appetite.  Denies chest pain, shortness of breath, nausea or vomiting.  Pumping for baby in the NICU.  Cobos out this AM and has not voided yet.      Medications     oxytocin in 0.9% NaCl 100 mL/hr (03/06/18 1445)     oxytocin in 0.9% NaCl       dextrose 5% and 0.45% NaCl + KCl 20 mEq/L Stopped (03/07/18 0637)     NO Rho (D) immune globulin (RhoGam) needed - mother Rh POSITIVE       - MEDICATION INSTRUCTIONS -       - MEDICATION INSTRUCTIONS -         [START ON 3/8/2018] ferrous sulfate  325 mg Oral Daily     sodium chloride (PF)  3 mL Intracatheter Q8H     acetaminophen  975 mg Oral Q8H     ketorolac  15 mg Intravenous Q6H     ibuprofen  600 mg Oral Q6H       Physical Exam   Temp: 98.5  F (36.9  C) Temp src: Oral BP: 93/56 Pulse: 90 Heart Rate: 80 Resp: 16 SpO2: 99 % O2 Device: None (Room air)    Vitals:    03/06/18 0834   Weight: 86.2 kg (190 lb)     Vital Signs with Ranges  Temp:  [97.9  F (36.6  C)-99.2  F (37.3  C)] 98.5  F (36.9  C)  Pulse:  [] 90  Heart Rate:  [] 80  Resp:  [16-20] 16  BP: ()/(47-75) 93/56  SpO2:  [97 %-100 %] 99 %  I/O last 3 completed shifts:  In: 1889 [I.V.:1889]  Out: 2350 [Urine:1150; Blood:1200]    Uterine fundus is firm, non-tender and at the level of the umbilicus  Incision covered with bandage that is clean  Extremities Non-tender    Data   Recent Labs   Lab Test  03/06/18   0925   ABO  A   RH  Pos   AS  Neg     Recent Labs   Lab Test  03/07/18   0709  03/06/18   0925   HGB  8.3*  9.8*     Recent Labs   Lab Test 08/29/17   RUQIGG  immune       Assessment & Plan   -1 Day Post-Op Procedure(s):   - Wound Class: II-Clean Contaminated for abruption and transverse position     Doing well.  No immediate surgical complications identified.  No excessive bleeding  Pain  well-controlled.  Monitor wound for signs of infection  Pain control measures as needed  Reportable signs and symptoms dicussed with the patient  Start iron supplementation tomorrow (history of iron deficiency anemia in pregnancy)    Anticipate discharge in 2 days    Isaura Drake DO

## 2018-03-07 NOTE — PLAN OF CARE
Problem: Patient Care Overview  Goal: Plan of Care/Patient Progress Review  Outcome: Adequate for Discharge Date Met: 03/07/18  Pt up and quincy. Urinating without difficulty, arndt d/c this am. Iv in place. Bonding with baby in NICU, visiting. Tylenol and Toradol effective for pain management. Flu shot declined. Continue to monitor.    Isaura Diaz

## 2018-03-07 NOTE — PLAN OF CARE
Problem: Patient Care Overview  Goal: Plan of Care/Patient Progress Review  Outcome: Improving  Patient meeting expected goals. Cobos with adequate output. VSS, encouraged fluids for some lower pressures. Attempts to breastfeed, no latch obtained.   Giving donor milk, but LPT transitioning and not taking milk well.  See infant's chart.  IV fluids infusing. Bandage to low abdomen is with foam dressing, no drainage. Bleeding small, with small clots, fundus firm, midline at UU.  NNP is here now and will be updated patient that infant will be going down to NICU.  Had done skin to skin most of shift and bonding well with infant.

## 2018-03-07 NOTE — PLAN OF CARE
Problem: Patient Care Overview  Goal: Plan of Care/Patient Progress Review  Outcome: Improving  Pt. VSS. Pain managed with tylenol and Toradol, also using abdominal binder. Dressing clean, dry, and intact. Fundus firm and midline, scant to small bleeding. Significant diastasis recti noted, able to easily feel intestine loops during fundal checks. Cobos draining adequately, urine dilute. Ambulated in room, tolerated well. IV saline locked at 0630. Patient requires some assistance with personal cares. Pumping for infant in NICU.

## 2018-03-08 PROCEDURE — 12000027 ZZH R&B OB

## 2018-03-08 PROCEDURE — 25000132 ZZH RX MED GY IP 250 OP 250 PS 637: Performed by: OBSTETRICS & GYNECOLOGY

## 2018-03-08 RX ADMIN — FERROUS SULFATE TAB 325 MG (65 MG ELEMENTAL FE) 325 MG: 325 (65 FE) TAB at 08:53

## 2018-03-08 RX ADMIN — IBUPROFEN 600 MG: 600 TABLET ORAL at 21:51

## 2018-03-08 RX ADMIN — IBUPROFEN 600 MG: 600 TABLET ORAL at 03:26

## 2018-03-08 RX ADMIN — ACETAMINOPHEN 975 MG: 325 TABLET, FILM COATED ORAL at 05:08

## 2018-03-08 RX ADMIN — OXYCODONE HYDROCHLORIDE 5 MG: 5 TABLET ORAL at 00:44

## 2018-03-08 RX ADMIN — IBUPROFEN 600 MG: 600 TABLET ORAL at 15:56

## 2018-03-08 RX ADMIN — ACETAMINOPHEN 975 MG: 325 TABLET, FILM COATED ORAL at 21:16

## 2018-03-08 RX ADMIN — IBUPROFEN 600 MG: 600 TABLET ORAL at 08:53

## 2018-03-08 RX ADMIN — SENNOSIDES AND DOCUSATE SODIUM 1 TABLET: 8.6; 5 TABLET ORAL at 21:16

## 2018-03-08 RX ADMIN — ACETAMINOPHEN 975 MG: 325 TABLET, FILM COATED ORAL at 13:17

## 2018-03-08 NOTE — PLAN OF CARE
Problem: Patient Care Overview  Goal: Plan of Care/Patient Progress Review  Outcome: Improving  VSS, Bonding well with baby in the NICU. Pain is well controlled with tylenol, ibuprofen, and an ice pack for incisional pain. Patient is voiding without difficulty and ambulating independently. Tolerating regular diet well. Independent in self cares. Patient is pumping to bring milk to the NICU. Has not pumped this shift. Meeting expected goals.

## 2018-03-08 NOTE — PLAN OF CARE
Problem: Patient Care Overview  Goal: Plan of Care/Patient Progress Review  Outcome: Improving  Pt is up and ambulating on hallway. + BS, + flatus, + BM.  Pt is breastpumping . Instructed to fill out birth certificate and depression scale. Encouraged to watch DVD.

## 2018-03-08 NOTE — PLAN OF CARE
Problem: Patient Care Overview  Goal: Plan of Care/Patient Progress Review  Outcome: Improving  Pt up and quincy. Voiding without difficulty. Bonding with baby, visiting in NICU. Encouraged to pump every 2-3 hours to encourage milk production. Scheduled ibuprofen and tylenol effective for pain management. Continue to monitor.     Isaura Diaz

## 2018-03-08 NOTE — PROGRESS NOTES
[facility] Obstetrics Post-Op / Progress Note         Assessment and Plan:    Assessment:   Post-operative day #2  Low transverse primary  section  L&D complications: Vaginal bleeding at 35 weeks - placental abruption      Doing well.  Clean wound without signs of infection.  No immediate surgical complications identified.  No excessive bleeding  Pain well-controlled.  Asymptomatic post op anemia due to pre existing anemia and expected intra op losses      Plan:   Ambulation encouraged  Monitor wound for signs of infection  Pain control measures as needed  Reportable signs and symptoms dicussed with the patient  Anticipate discharge tomorrow           Interval History:   Doing well.  Pain is well-controlled.  No fevers.  No history of wound drainage, warmth or significant erythema.  Good appetite.  Denies chest pain, shortness of breath, nausea or vomiting.  Ambulatory.  Breastfeeding well.          Significant Problems:      Patient Active Problem List   Diagnosis      delivery delivered     Transverse fetal lie, delivered, current hospitalization     Oligohydramnios, delivered, current hospitalization     Placental abruption, delivered, current hospitalization      delivery, delivered             Review of Systems:    The patient denies any chest pain, shortness of breath, excessive pain, fever, chills, purulent drainage from the wound, nausea or vomiting.          Medications:   All medications related to the patient's surgery have been reviewed          Physical Exam:     All vitals stable  Patient Vitals for the past 12 hrs:   BP Temp Temp src Pulse Heart Rate Resp   18 0730 110/63 97.6  F (36.4  C) Oral - 83 18   18 0055 110/63 97.9  F (36.6  C) Oral 74 - 18     Wound clean and dry with minimal or no drainage.  Surrounding skin with minimal erythema.  Ext NT w/o edema          Data:     Hemoglobin   Date Value Ref Range Status   2018 8.3 (L) 11.7 - 15.7 g/dL  Final   03/06/2018 9.8 (L) 11.7 - 15.7 g/dL Final   01/23/2017 13.1 11.7 - 15.7 g/dL Final   01/20/2017 12.1 11.7 - 15.7 g/dL Final     -    Branden Velazquez MD  3/8/2018 8:04 AM

## 2018-03-08 NOTE — PLAN OF CARE
Problem: Patient Care Overview  Goal: Plan of Care/Patient Progress Review  Outcome: Improving  Patient meeting expected goals. VSS. Pain is being managed with Tylenol, Ibuprofen and Oxycodone; effective. Pain is stemming from incision, which is still covered with foam dressing, no drainage present.  Is using breast pump, but not getting results. Encouraged patient to keep pumping every 2-3 hours as it will help stimulate her milk supply and patient agreed. Voiding without difficulty, fundus firm U1, midline. IV SL was removed. Patient has not had her flu shot this season, nor have kids or FOB. Educated parents that it is strongly recommended that masks are worn when down in NICU visiting infant but that unvaccinated children are not allowed to visit the NICU. Parents verbalized understanding.   Later in evening, Oxycodone was offered, but patient declined.  Earlier dose had made patient feel slightly lightheaded. Safe strategies for standing and walking discussed; grippy socks, to stand first and wait to establish how feeling before walking. Patient verbalized understanding.

## 2018-03-09 VITALS
OXYGEN SATURATION: 99 % | BODY MASS INDEX: 32.44 KG/M2 | TEMPERATURE: 97.8 F | SYSTOLIC BLOOD PRESSURE: 106 MMHG | DIASTOLIC BLOOD PRESSURE: 68 MMHG | RESPIRATION RATE: 16 BRPM | WEIGHT: 190 LBS | HEIGHT: 64 IN | HEART RATE: 76 BPM

## 2018-03-09 PROCEDURE — 25000132 ZZH RX MED GY IP 250 OP 250 PS 637: Performed by: OBSTETRICS & GYNECOLOGY

## 2018-03-09 PROCEDURE — 90686 IIV4 VACC NO PRSV 0.5 ML IM: CPT | Performed by: OBSTETRICS & GYNECOLOGY

## 2018-03-09 PROCEDURE — 25000128 H RX IP 250 OP 636: Performed by: OBSTETRICS & GYNECOLOGY

## 2018-03-09 RX ORDER — BREAST PUMP
1 EACH MISCELLANEOUS PRN
Qty: 1 EACH | Refills: 0 | Status: SHIPPED | OUTPATIENT
Start: 2018-03-09

## 2018-03-09 RX ORDER — FERROUS SULFATE 325(65) MG
325 TABLET ORAL 2 TIMES DAILY
Qty: 60 TABLET | Refills: 1 | Status: SHIPPED | OUTPATIENT
Start: 2018-03-09

## 2018-03-09 RX ORDER — IBUPROFEN 600 MG/1
600 TABLET, FILM COATED ORAL EVERY 6 HOURS
Qty: 60 TABLET | Refills: 0 | Status: SHIPPED | OUTPATIENT
Start: 2018-03-09

## 2018-03-09 RX ORDER — OXYCODONE HYDROCHLORIDE 5 MG/1
5-10 TABLET ORAL
Qty: 30 TABLET | Refills: 0 | Status: SHIPPED | OUTPATIENT
Start: 2018-03-09

## 2018-03-09 RX ORDER — AMOXICILLIN 250 MG
1-2 CAPSULE ORAL DAILY PRN
Qty: 60 TABLET | Refills: 0 | Status: SHIPPED | OUTPATIENT
Start: 2018-03-09

## 2018-03-09 RX ADMIN — IBUPROFEN 600 MG: 600 TABLET ORAL at 10:08

## 2018-03-09 RX ADMIN — IBUPROFEN 600 MG: 600 TABLET ORAL at 04:10

## 2018-03-09 RX ADMIN — ACETAMINOPHEN 975 MG: 325 TABLET, FILM COATED ORAL at 05:31

## 2018-03-09 RX ADMIN — FERROUS SULFATE TAB 325 MG (65 MG ELEMENTAL FE) 325 MG: 325 (65 FE) TAB at 10:08

## 2018-03-09 RX ADMIN — INFLUENZA A VIRUS A/MICHIGAN/45/2015 X-275 (H1N1) ANTIGEN (FORMALDEHYDE INACTIVATED), INFLUENZA A VIRUS A/HONG KONG/4801/2014 X-263B (H3N2) ANTIGEN (FORMALDEHYDE INACTIVATED), INFLUENZA B VIRUS B/PHUKET/3073/2013 ANTIGEN (FORMALDEHYDE INACTIVATED), AND INFLUENZA B VIRUS B/BRISBANE/60/2008 ANTIGEN (FORMALDEHYDE INACTIVATED) 0.5 ML: 15; 15; 15; 15 INJECTION, SUSPENSION INTRAMUSCULAR at 10:08

## 2018-03-09 NOTE — DISCHARGE SUMMARY
The patient had a primary low transverse  section on 3/6/2018 for placental abruption, vaginal bleeding and oligohydramnios with transverse lie at 35+ weeks gestation. Please refer to her operative report regarding her delivery. The patient s postpartum course was uncomplicated. She was afebrile throughout her postpartum course and had asymptomatic anemia on iron supplementation.  Her most recent hemoglobin measured   Hemoglobin   Date Value Ref Range Status   2018 8.3 (L) 11.7 - 15.7 g/dL Final     The patient will return to clinic in 6 weeks for routine follow-up exam.  Discharge instructions were reviewed with the patient.  She is breast feeding and her baby is currently in the NICU.  She will bed and board as available.  The patient was given discharge prescriptions for Oxycodone, Motrin, Senokot-S, FeSO4 BID and Electric breast pump.  Dr. Mora Parnell

## 2018-03-09 NOTE — LACTATION NOTE
This note was copied from a baby's chart.  As KAUSHIK spoke with Cheryl at the bedside in NICU. She is anticipating d/c today and is to have a Rx from her OB for a medical grade breast pump. She plans to go home for other siblings.  I gave supplies for pumping at home and instructions. I also gave her an abdominal binder for hands free pumping. She reports breast feeding her other children for ~ one month and then lost her milk supply. We discussed her pumping strategies. Currently she has a small abrasion on the tip of her rt nipple. Her milk is pink tinged and I reassured we are able to use that milk.  It appears it is from the pump setting. Encouraged decreasing suction. She reports feeling full after pumping. Encouraged running pump on that setting x2 until no more milk dripping. I gave her Hydrogel pad for healing rt nipple and nipple cream to aid in maintaining nipple integrity. Will continue to follow and support.

## 2018-03-09 NOTE — DISCHARGE INSTRUCTIONS
LACTATION: [phone]       Postop  Birth Instructions    Activity       Do not lift more than 10 pounds for 6 weeks after surgery.  Ask family and friends for help when you need it.    No driving until you have stopped taking your pain medications (usually two weeks after surgery).    No heavy exercise or activity for 6 weeks.  Don't do anything that will put a strain on your surgery site.    Don't strain when using the toilet.  Your care team may prescribe a stool softener if you have problems with your bowel movements.     To care for your incision:       Keep the incision clean and dry.    Do not soak your incision in water. No swimming or hot tubs until it has fully healed. You may soak in the bathtub if the water level is below your incision.    Do not use peroxide, gel, cream, lotion, or ointment on your incision.    Adjust your clothes to avoid pressure on your surgery site (check the elastic in your underwear for example).     You may see a small amount of clear or pink drainage and this is normal.  Check with your health care provider:       If the drainage increases or has an odor.    If the incision reddens, you have swelling, or develop a rash.    If you have increased pain and the medicine we prescribed doesn't help.    If you have a fever above 100.4 F (38 C) with or without chills when placing thermometer under your tongue.   The area around your incision (surgery wound), will feel numb.  This is normal. The numbness should go away in less than a year.     Keep your hands clean:  Always wash your hands before touching your incision (surgery wound). This helps reduce your risk of infection. If your hands aren't dirty, you may use an alcohol hand-rub to clean your hands. Keep your nails clean and short.    Call your healthcare provider if you have any of these symptoms:       You soak a sanitary pad with blood within 1 hour, or you see blood clots larger than a golf ball.    Bleeding that  lasts more than 6 weeks.    Vaginal discharge that smells bad.    Severe pain, cramping or tenderness in your lower belly area.    A need to urinate more frequently (use the toilet more often), more urgently (use the toilet very quickly), or it burns when you urinate.    Nausea and vomiting.    Redness, swelling or pain around a vein in your leg.    Problems breastfeeding or a red or painful area on your breast.    Chest pain and cough or are gasping for air.    Problems with coping with sadness, anxiety or depression. If you have concerns about hurting yourself or the baby, call your provider immediately.      You have questions or concerns after you return home.

## 2018-03-09 NOTE — PLAN OF CARE
Problem: Patient Care Overview  Goal: Plan of Care/Patient Progress Review  Outcome: Improving  VSS, Bonding well with baby in NICU. Pain is well controlled with tylenol, ibuprofen, and abdominal binder. Patient is voiding without difficulty and ambulating independently. Tolerating regular diet well. Indepenent in self cares. Mom is pumping but states she is not getting more than a few drops. Educated mom on hand expression and to massage breasts before and during pumping. Father of baby is present and attentive to moms needs as well as assisting in hand expression. Meeting expected goals.

## 2018-03-10 ENCOUNTER — HOSPITAL ENCOUNTER (EMERGENCY)
Facility: CLINIC | Age: 33
Discharge: HOME | End: 2018-03-11
Attending: EMERGENCY MEDICINE | Admitting: EMERGENCY MEDICINE
Payer: COMMERCIAL

## 2018-03-10 ENCOUNTER — APPOINTMENT (OUTPATIENT)
Dept: ULTRASOUND IMAGING | Facility: CLINIC | Age: 33
End: 2018-03-10
Attending: EMERGENCY MEDICINE
Payer: COMMERCIAL

## 2018-03-10 DIAGNOSIS — I82.90 DEEP VEIN THROMBOSIS (DVT) OF NON-EXTREMITY VEIN, UNSPECIFIED CHRONICITY: ICD-10-CM

## 2018-03-10 DIAGNOSIS — D64.9 ANEMIA, UNSPECIFIED TYPE: ICD-10-CM

## 2018-03-10 PROCEDURE — 93970 EXTREMITY STUDY: CPT

## 2018-03-10 PROCEDURE — 99284 EMERGENCY DEPT VISIT MOD MDM: CPT | Mod: 25

## 2018-03-10 ASSESSMENT — ENCOUNTER SYMPTOMS: MYALGIAS: 1

## 2018-03-10 NOTE — ED AVS SNAPSHOT
[facility] Emergency Department    [address]    [place] [address]    Phone:  [phone]    Fax:  [phone]                                       Cheryl Coles   MRN: [id number]    Department:  [facility] Emergency Department   Date of Visit:  3/10/2018           Patient Information     Date Of Birth          1985        Your diagnoses for this visit were:     Deep vein thrombosis (DVT) of non-extremity vein, unspecified chronicity     Anemia, unspecified type        You were seen by Sj Lindquist MD.      Follow-up Information     Follow up with [facility], [place]. Schedule an appointment as soon as possible for a visit in 3 days.    Specialty:  Family Practice    Contact information:    [address] [place] [address]  [phone]          Discharge Instructions       Please follow up for INR coumadin level check with your primary doctor in 3 days        Anemia, Type Not Specified (Adult)  Red blood cells carry oxygen to the tissues of your body. Anemia is a condition in which you have too few red blood cells. You need iron to make red blood cells. The most common cause of anemia is not having enough iron. This may be because of:    Loss of blood. This can be caused by heavy menstrual periods. It can also be caused by bleeding from the stomach or intestines.    Poor diet. You may not be eating enough foods that contain iron.  Other causes of anemia include certain vitamin deficiencies, chronic kidney disease, and certain other chronic illnesses.  Anemia makes you to feel tired and run down. When anemia becomes severe, your skin becomes pale. You may feel short of breath after physical activity. Other symptoms include:    Headaches    Dizziness    Leg cramps with physical activity    Drowsiness  Home care  Follow these guidelines when caring for yourself at home:    Don t overexert yourself.    Talk with your healthcare provider before traveling  by air or traveling to high altitudes.  Follow-up care  Follow up with your healthcare provider, or as advised. You may need other blood tests to find out the exact cause of your anemia. If you had testing done today, it may take several days to get all of the results. You can follow up with your own healthcare provider to get the results.  When to seek medical advice  Call your healthcare provider right away if any of these occur:    Shortness of breath or chest pain    Dizziness or fainting gets worse    Vomiting blood or passing red or black-colored stool  Date Last Reviewed: 2/25/2016 2000-2017 [employer]. [address] [place] [address]. All rights reserved. This information is not intended as a substitute for professional medical care. Always follow your healthcare professional's instructions.          Discharge References/Attachments     DISCHARGE INSTRUCTIONS FOR DEEP VEIN THROMBOSIS (ENGLISH)      24 Hour Appointment Hotline       To make an appointment at any [facility], call [phone] ([phone]). If you don't have a family doctor or clinic, we will help you find one. [facility] clinics are conveniently located to serve the needs of you and your family.             Review of your medicines      START taking        Dose / Directions Last dose taken    enoxaparin 100 MG/ML injection   Commonly known as:  LOVENOX   Dose:  1 mg/kg   Quantity:  10 Syringe        Inject 0.89 mLs (89 mg) Subcutaneous every 12 hours   Refills:  0        warfarin 5 MG tablet   Commonly known as:  COUMADIN   Dose:  5 mg   Quantity:  30 tablet        Take 1 tablet (5 mg) by mouth daily   Refills:  0          Our records show that you are taking the medicines listed below. If these are incorrect, please call your family doctor or clinic.        Dose / Directions Last dose taken    breast pump Misc   Dose:  1 each   Quantity:  1 each        1 each as needed   Refills:  0        ferrous sulfate  325 (65 FE) MG tablet   Commonly known as:  IRON SUPPLEMENT   Dose:  325 mg   Quantity:  60 tablet        Take 1 tablet (325 mg) by mouth 2 times daily   Refills:  1        ibuprofen 600 MG tablet   Commonly known as:  ADVIL/MOTRIN   Dose:  600 mg   Quantity:  60 tablet        Take 1 tablet (600 mg) by mouth every 6 hours   Refills:  0        oxyCODONE IR 5 MG tablet   Commonly known as:  ROXICODONE   Dose:  5-10 mg   Quantity:  30 tablet        Take 1-2 tablets (5-10 mg) by mouth every 3 hours as needed for other (pain control or improvement in physical function. Hold dose for analgesic side effects.)   Refills:  0        prenatal multivitamin plus iron 27-0.8 MG Tabs per tablet   Dose:  1 tablet        Take 1 tablet by mouth daily   Refills:  0        senna-docusate 8.6-50 MG per tablet   Commonly known as:  SENOKOT-S;PERICOLACE   Dose:  1-2 tablet   Quantity:  60 tablet        Take 1-2 tablets by mouth daily as needed for constipation   Refills:  0                Prescriptions were sent or printed at these locations (2 Prescriptions)                   Other Prescriptions                Printed at Department/Unit printer (2 of 2)         enoxaparin (LOVENOX) 100 MG/ML injection               warfarin (COUMADIN) 5 MG tablet                Procedures and tests performed during your visit     Basic metabolic panel (BMP)    CBC + differential    INR    PTT    US Lower Extremity Venous Duplex Bilateral      Orders Needing Specimen Collection     None      Pending Results     Date and Time Order Name Status Description    3/10/2018 2211 US Lower Extremity Venous Duplex Bilateral Preliminary             Pending Culture Results     No orders found for last 3 day(s).            Pending Results Instructions     If you had any lab results that were not finalized at the time of your Discharge, you can call the ED Lab Result RN at [phone]. You will be contacted by this team for any positive Lab results or changes in  treatment. The nurses are available 7 days a week from 10A to 6:30P.  You can leave a message 24 hours per day and they will return your call.        Test Results From Your Hospital Stay        3/11/2018 12:10 AM      Narrative     ULTRASOUND VENOUS BILATERAL LOWER EXTREMITIES WITH DOPPLER   3/10/2018  11:53 PM     HISTORY:  section 4 days ago. Leg swelling.    COMPARISON: None.    TECHNIQUE: Spectral waveform and color Doppler evaluation were  performed.    FINDINGS: A few centimeter long segment of the right posterior tibial  vein at the level of the ankle is not compressible, consistent with  thrombus. Normal compressibility of bilateral common femoral, femoral,  popliteal, peroneal and greater saphenous veins and the left posterior  tibial vein. Unremarkable Doppler waveform evaluation of bilateral  common femoral, femoral and popliteal veins.        Impression     IMPRESSION:   1. A few centimeter long segment of the right posterior tibial vein at  the level of the ankle is not compressible, consistent with thrombus.  2. No other evidence of thrombus in the major veins of bilateral lower  extremities.         3/11/2018 12:54 AM      Component Results     Component Value Ref Range & Units Status    WBC 6.7 4.0 - 11.0 10e9/L Final    RBC Count 3.09 (L) 3.8 - 5.2 10e12/L Final    Hemoglobin 8.2 (L) 11.7 - 15.7 g/dL Final    Hematocrit 27.0 (L) 35.0 - 47.0 % Final    MCV 87 78 - 100 fl Final    MCH 26.5 26.5 - 33.0 pg Final    MCHC 30.4 (L) 31.5 - 36.5 g/dL Final    RDW 17.1 (H) 10.0 - 15.0 % Final    Platelet Count 414 150 - 450 10e9/L Final    Diff Method Automated Method  Final    % Neutrophils 56.8 % Final    % Lymphocytes 31.3 % Final    % Monocytes 7.1 % Final    % Eosinophils 4.2 % Final    % Basophils 0.3 % Final    % Immature Granulocytes 0.3 % Final    Nucleated RBCs 0 0 /100 Final    Absolute Neutrophil 3.8 1.6 - 8.3 10e9/L Final    Absolute Lymphocytes 2.1 0.8 - 5.3 10e9/L Final    Absolute  Monocytes 0.5 0.0 - 1.3 10e9/L Final    Absolute Eosinophils 0.3 0.0 - 0.7 10e9/L Final    Absolute Basophils 0.0 0.0 - 0.2 10e9/L Final    Abs Immature Granulocytes 0.0 0 - 0.4 10e9/L Final    Absolute Nucleated RBC 0.0  Final         3/11/2018  1:06 AM      Component Results     Component Value Ref Range & Units Status    Sodium 142 133 - 144 mmol/L Final    Potassium 4.0 3.4 - 5.3 mmol/L Final    Chloride 112 (H) 94 - 109 mmol/L Final    Carbon Dioxide 26 20 - 32 mmol/L Final    Anion Gap 4 3 - 14 mmol/L Final    Glucose 75 70 - 99 mg/dL Final    Urea Nitrogen 11 7 - 30 mg/dL Final    Creatinine 0.48 (L) 0.52 - 1.04 mg/dL Final    GFR Estimate >90 >60 mL/min/1.7m2 Final    Non  GFR Calc    GFR Estimate If Black >90 >60 mL/min/1.7m2 Final    African American GFR Calc    Calcium 8.3 (L) 8.5 - 10.1 mg/dL Final         3/11/2018  1:01 AM      Component Results     Component Value Ref Range & Units Status    INR 1.00 0.86 - 1.14 Final         3/11/2018  1:01 AM      Component Results     Component Value Ref Range & Units Status    PTT 30 22 - 37 sec Final                Clinical Quality Measure: Blood Pressure Screening     Your blood pressure was checked while you were in the emergency department today. The last reading we obtained was  BP: 122/81 . Please read the guidelines below about what these numbers mean and what you should do about them.  If your systolic blood pressure (the top number) is less than 120 and your diastolic blood pressure (the bottom number) is less than 80, then your blood pressure is normal. There is nothing more that you need to do about it.  If your systolic blood pressure (the top number) is 120-139 or your diastolic blood pressure (the bottom number) is 80-89, your blood pressure may be higher than it should be. You should have your blood pressure rechecked within a year by a primary care provider.  If your systolic blood pressure (the top number) is 140 or greater or your  "diastolic blood pressure (the bottom number) is 90 or greater, you may have high blood pressure. High blood pressure is treatable, but if left untreated over time it can put you at risk for heart attack, stroke, or kidney failure. You should have your blood pressure rechecked by a primary care provider within the next 4 weeks.  If your provider in the emergency department today gave you specific instructions to follow-up with your doctor or provider even sooner than that, you should follow that instruction and not wait for up to 4 weeks for your follow-up visit.        Thank you for choosing [facility]       Thank you for choosing [facility] for your care. Our goal is always to provide you with excellent care. Hearing back from our patients is one way we can continue to improve our services. Please take a few minutes to complete the written survey that you may receive in the mail after you visit with us. Thank you!        [employer] Information     [employer] lets you send messages to your doctor, view your test results, renew your prescriptions, schedule appointments and more. To sign up, go to [email or web address] . Click on \"Log in\" on the left side of the screen, which will take you to the Welcome page. Then click on \"Sign up Now\" on the right side of the page.     You will be asked to enter the access code listed below, as well as some personal information. Please follow the directions to create your username and password.     Your access code is: [email or web address]  Expires: 2018 10:52 AM     Your access code will  in 90 days. If you need help or a new code, please call your [facility] clinic or [phone].        Care EveryWhere ID     This is your Care EveryWhere ID. This could be used by other organizations to access your [facility] medical records  [id number]        Equal Access to Services     SAIDA CHANG AH: hilton Hobson, manisha mackenzie " param baker ah. So [facility] [phone].    ATENCIÓN: Si habla español, tiene a siddiqui disposición servicios gratuitos de asistencia lingüística. Llame al [phone].    We comply with applicable federal civil rights laws and [place] laws. We do not discriminate on the basis of race, color, national origin, age, disability, sex, sexual orientation, or gender identity.            After Visit Summary       This is your record. Keep this with you and show to your community pharmacist(s) and doctor(s) at your next visit.

## 2018-03-10 NOTE — ED AVS SNAPSHOT
[facility] Emergency Department    [address]    [facility] [address]    Phone:  [phone]    Fax:  [phone]                                       Cheryl Coles   MRN: [id number]    Department:  [facility] Emergency Department   Date of Visit:  3/10/2018           After Visit Summary Signature Page     I have received my discharge instructions, and my questions have been answered. I have discussed any challenges I see with this plan with the nurse or doctor.    ..........................................................................................................................................  Patient/Patient Representative Signature      ..........................................................................................................................................  Patient Representative Print Name and Relationship to Patient    ..................................................               ................................................  Date                                            Time    ..........................................................................................................................................  Reviewed by Signature/Title    ...................................................              ..............................................  Date                                                            Time

## 2018-03-11 VITALS
HEART RATE: 90 BPM | BODY MASS INDEX: 33.47 KG/M2 | DIASTOLIC BLOOD PRESSURE: 85 MMHG | TEMPERATURE: 98.8 F | SYSTOLIC BLOOD PRESSURE: 124 MMHG | RESPIRATION RATE: 20 BRPM | WEIGHT: 195 LBS | OXYGEN SATURATION: 99 %

## 2018-03-11 LAB
ANION GAP SERPL CALCULATED.3IONS-SCNC: 4 MMOL/L (ref 3–14)
APTT PPP: 30 SEC (ref 22–37)
BASOPHILS # BLD AUTO: 0 10E9/L (ref 0–0.2)
BASOPHILS NFR BLD AUTO: 0.3 %
BUN SERPL-MCNC: 11 MG/DL (ref 7–30)
CALCIUM SERPL-MCNC: 8.3 MG/DL (ref 8.5–10.1)
CHLORIDE SERPL-SCNC: 112 MMOL/L (ref 94–109)
CO2 SERPL-SCNC: 26 MMOL/L (ref 20–32)
CREAT SERPL-MCNC: 0.48 MG/DL (ref 0.52–1.04)
DIFFERENTIAL METHOD BLD: ABNORMAL
EOSINOPHIL # BLD AUTO: 0.3 10E9/L (ref 0–0.7)
EOSINOPHIL NFR BLD AUTO: 4.2 %
ERYTHROCYTE [DISTWIDTH] IN BLOOD BY AUTOMATED COUNT: 17.1 % (ref 10–15)
GFR SERPL CREATININE-BSD FRML MDRD: >90 ML/MIN/1.7M2
GLUCOSE SERPL-MCNC: 75 MG/DL (ref 70–99)
HCT VFR BLD AUTO: 27 % (ref 35–47)
HGB BLD-MCNC: 8.2 G/DL (ref 11.7–15.7)
IMM GRANULOCYTES # BLD: 0 10E9/L (ref 0–0.4)
IMM GRANULOCYTES NFR BLD: 0.3 %
INR PPP: 1 (ref 0.86–1.14)
LYMPHOCYTES # BLD AUTO: 2.1 10E9/L (ref 0.8–5.3)
LYMPHOCYTES NFR BLD AUTO: 31.3 %
MCH RBC QN AUTO: 26.5 PG (ref 26.5–33)
MCHC RBC AUTO-ENTMCNC: 30.4 G/DL (ref 31.5–36.5)
MCV RBC AUTO: 87 FL (ref 78–100)
MONOCYTES # BLD AUTO: 0.5 10E9/L (ref 0–1.3)
MONOCYTES NFR BLD AUTO: 7.1 %
NEUTROPHILS # BLD AUTO: 3.8 10E9/L (ref 1.6–8.3)
NEUTROPHILS NFR BLD AUTO: 56.8 %
NRBC # BLD AUTO: 0 10*3/UL
NRBC BLD AUTO-RTO: 0 /100
PLATELET # BLD AUTO: 414 10E9/L (ref 150–450)
POTASSIUM SERPL-SCNC: 4 MMOL/L (ref 3.4–5.3)
RBC # BLD AUTO: 3.09 10E12/L (ref 3.8–5.2)
SODIUM SERPL-SCNC: 142 MMOL/L (ref 133–144)
WBC # BLD AUTO: 6.7 10E9/L (ref 4–11)

## 2018-03-11 PROCEDURE — 96372 THER/PROPH/DIAG INJ SC/IM: CPT

## 2018-03-11 PROCEDURE — 36415 COLL VENOUS BLD VENIPUNCTURE: CPT | Performed by: EMERGENCY MEDICINE

## 2018-03-11 PROCEDURE — 85025 COMPLETE CBC W/AUTO DIFF WBC: CPT | Performed by: EMERGENCY MEDICINE

## 2018-03-11 PROCEDURE — 80048 BASIC METABOLIC PNL TOTAL CA: CPT | Performed by: EMERGENCY MEDICINE

## 2018-03-11 PROCEDURE — 85730 THROMBOPLASTIN TIME PARTIAL: CPT | Performed by: EMERGENCY MEDICINE

## 2018-03-11 PROCEDURE — 85610 PROTHROMBIN TIME: CPT | Performed by: EMERGENCY MEDICINE

## 2018-03-11 PROCEDURE — 25000128 H RX IP 250 OP 636: Performed by: EMERGENCY MEDICINE

## 2018-03-11 RX ORDER — WARFARIN SODIUM 5 MG/1
5 TABLET ORAL DAILY
Qty: 30 TABLET | Refills: 0 | Status: SHIPPED | OUTPATIENT
Start: 2018-03-11

## 2018-03-11 RX ADMIN — ENOXAPARIN SODIUM 90 MG: 100 INJECTION SUBCUTANEOUS at 01:32

## 2018-03-11 NOTE — ED PROVIDER NOTES
History     Chief Complaint:  Leg Swelling    HPI   Cheryl Coles is a 32 year old female who presents to the emergency department today for evaluation of leg swelling. The patient underwent  section at 35 weeks 4 days ago. She states yesterday she began to appreciate swelling to her legs bilaterally, worse on the left. She also endorses some pain to her posterior left calf. Due to this she was concerned and presented in the emergency department for evaluation. The patient notes the swelling seems to be worse with walking.    Allergies:  No Known Drug Allergies      Medications:    The patient is currently on no regular medications.    Past Medical History:    Antepartum hemorrhage   delivery  Oligohydramnios   Placental abruption third trimester  Transverse lie of fetus     Past Surgical History:     section     Family History:    History reviewed. No pertinent family history.      Social History:  The patient was accompanied to the ED by her .  Smoking Status: Never smoker  Smokeless Tobacco: No  Alcohol Use: No    Marital Status:        Review of Systems   Cardiovascular: Positive for leg swelling.   Musculoskeletal: Positive for myalgias (left calf).   All other systems reviewed and are negative.    Physical Exam     Patient Vitals for the past 24 hrs:   BP Temp Temp src Pulse Resp SpO2 Weight   03/10/18 2350 122/81 - - 90 - 99 % -   03/10/18 2115 124/79 98.8  F (37.1  C) Temporal 98 20 98 % 88.5 kg (195 lb)      Physical Exam  Constitutional:  Oriented to person, place, and time. Well appearing.   HENT:   Head:    Normocephalic.   Mouth/Throat:   Oropharynx is clear and moist.   Eyes:    EOM are normal. Pupils are equal, round, and reactive to light.   Neck:    Neck supple.   Musculoskeletal:  Normal range of motion. 2+ pitting edema, left larger than right. No calf tenderness   Neurological:   Alert and oriented to person, place, and time.           Moves all 4  extremities spontaneously    Skin:    No rash noted. No pallor.    Emergency Department Course     Imaging:  Radiology findings were communicated with the patient who voiced understanding of the findings.    US Lower Extremity Venous Duplex bilateral:   IMPRESSION:   1. A few centimeter long segment of the right posterior tibial vein at  the level of the ankle is not compressible, consistent with thrombus.  2. No other evidence of thrombus in the major veins of bilateral lower  extremities.  Report per radiology       Laboratory:  Laboratory findings were communicated with the patient who voiced understanding of the findings.    CBC: WBC 6.7, HGB 8.2 (L),   BMP: Chloride 112 (H), Creatinine 0.48 (L), Calcium 8.3 (L), o/w WNL   INR: 1.00   PTT: 30     Emergency Department Course:  Nursing notes and vitals reviewed.  2210 I performed an exam of the patient as documented above.   The patient was sent for a lower extremity ultrasound while in the emergency department, results above.    IV was inserted and blood was drawn for laboratory testing, results above.   0115 I rechecked the patient and updated her on her results.   Findings and plan explained to the Patient and spouse. Patient discharged home with instructions regarding supportive care, medications, and reasons to return. The importance of close follow-up was reviewed. The patient was prescribed lovenox. I personally reviewed the imaging results with the Patient and spouse and answered all related questions prior to discharge.   Impression & Plan      Medical Decision Making:  Cheryl Coles is a 32 year old female who presents for evaluation of leg swelling and some posterior left calf pain.  The workup in the Emergency Department indicates this is due to deep venous thrombosis.  This was discussed with the patient.  There are no symptoms to suggest this has progressed to pulmonary embolism.  Lovenox and first dose of coumadin were initiated after  discussion with the patient after clarification of any contraindications to this therapy.  There are none but patient understands the risk/benefit ratio to this therapy and the possibility of serious and/or life-threatening hemorrhage.  I will discharge the patient home with prescriptions for anticoagulants, and strict return precautions. Patient is anemic and understands this is not new, and knows to follow up with her primary care doctor to have this monitored.     Diagnosis:    ICD-10-CM    1. Deep vein thrombosis (DVT) of non-extremity vein, unspecified chronicity I82.90        Disposition:  Discharged to home with the below prescription.     Discharge Medications:  New Prescriptions    ENOXAPARIN (LOVENOX) 100 MG/ML INJECTION    Inject 0.89 mLs (89 mg) Subcutaneous every 12 hours    WARFARIN (COUMADIN) 5 MG TABLET    Take 1 tablet (5 mg) by mouth daily         Scribe Disclosure:  I, Jefe Craig, am serving as a scribe at 10:08 PM on 3/10/2018 to document services personally performed by Sj Lindquist MD based on my observations and the provider's statements to me.    3/10/2018   [facility] EMERGENCY DEPARTMENT       Sj Lindquist MD  03/11/18 0558

## 2018-03-11 NOTE — ED NOTES
Patient states noticed swelling in legs. Left leg pain started yesterday. ABC intact alert and no distress. Patient states she had a  C- section on Tuesday.

## 2018-03-11 NOTE — DISCHARGE INSTRUCTIONS
Please follow up for INR coumadin level check with your primary doctor in 3 days        Anemia, Type Not Specified (Adult)  Red blood cells carry oxygen to the tissues of your body. Anemia is a condition in which you have too few red blood cells. You need iron to make red blood cells. The most common cause of anemia is not having enough iron. This may be because of:    Loss of blood. This can be caused by heavy menstrual periods. It can also be caused by bleeding from the stomach or intestines.    Poor diet. You may not be eating enough foods that contain iron.  Other causes of anemia include certain vitamin deficiencies, chronic kidney disease, and certain other chronic illnesses.  Anemia makes you to feel tired and run down. When anemia becomes severe, your skin becomes pale. You may feel short of breath after physical activity. Other symptoms include:    Headaches    Dizziness    Leg cramps with physical activity    Drowsiness  Home care  Follow these guidelines when caring for yourself at home:    Don t overexert yourself.    Talk with your healthcare provider before traveling by air or traveling to high altitudes.  Follow-up care  Follow up with your healthcare provider, or as advised. You may need other blood tests to find out the exact cause of your anemia. If you had testing done today, it may take several days to get all of the results. You can follow up with your own healthcare provider to get the results.  When to seek medical advice  Call your healthcare provider right away if any of these occur:    Shortness of breath or chest pain    Dizziness or fainting gets worse    Vomiting blood or passing red or black-colored stool  Date Last Reviewed: 2/25/2016 2000-2017 [employer]. [address] [place] [address], [place] [address]. All rights reserved. This information is not intended as a substitute for professional medical care. Always follow your healthcare professional's instructions.

## 2018-03-12 ENCOUNTER — LACTATION ENCOUNTER (OUTPATIENT)
Age: 33
End: 2018-03-12

## 2018-03-12 NOTE — LACTATION NOTE
This note was copied from a baby's chart.  As KAUSHIK spoke with Cheryl at the bedside. We reviewed her medications Coumadin and Lovenox as safe to use with breastfeeding. Babe sleepy so not feeding at this time. . She reports nipple is healed and has generalized soreness from pumping. Pumping strategies reviewed. Her milk volume is increasing and on target. Will continue to follow and support.

## 2018-03-14 ENCOUNTER — LACTATION ENCOUNTER (OUTPATIENT)
Age: 33
End: 2018-03-14

## 2018-03-14 NOTE — LACTATION NOTE
"This note was copied from a baby's chart.  As LC spoke with Cheryl in the NICU. She breast fed Partha with 20mm nipple shield for 20mL per scale. She reports no issues with pumping and I verified milk storage and handling. Her milk volume appears to be appropriate with noted difference in volumes on right side. Reviewed management of \"hard\" spots and applied heat while she pumped. Will continue to follow and support.  "

## 2018-03-16 ENCOUNTER — LACTATION ENCOUNTER (OUTPATIENT)
Age: 33
End: 2018-03-16

## 2018-03-16 NOTE — LACTATION NOTE
This note was copied from a baby's chart.  As KAUSHIK spoke with Cheryl in the NICU. She kept a log of her pumping times for every 4 hours and her milk volume continues to be > target. Anticipating d/c tomorrow for Partha. Will review discharge handouts today.

## 2018-03-19 ENCOUNTER — LACTATION ENCOUNTER (OUTPATIENT)
Age: 33
End: 2018-03-19

## 2018-03-19 NOTE — LACTATION NOTE
This note was copied from a baby's chart.  Spoke with Cheryl at bedside in NICU. We reviewed her pumping strategies and milk volume. Her volume is > target. She is not pumping at night and I recommend pumping every 4 hr thruout day and night and monitor volume again on Wednesday. Rt breast soreness appears to be from breast shield sizing. Will change to smaller size and reassess on Wednesday. She intends to breast feed at home but is only bottling here to advance more quickly to home. The family will be moving on Saturday to [place]. Will continue to follow and support.

## 2018-03-21 ENCOUNTER — LACTATION ENCOUNTER (OUTPATIENT)
Age: 33
End: 2018-03-21

## 2018-03-21 NOTE — LACTATION NOTE
This note was copied from a baby's chart.  As LC spoke with Cheryl at the bedside in the NICU. She has monitored her milk volume for pumping every 4 hr and continues to be >target volume. Encouraged move to 4 1/2hr between pumpings monitoring volume. If in pain always pump at that time. If tolerating 41/2hr and volume acceptable in a few days pump every 5 hours. Anticipating d/c tomorrow discharge handouts reviewed for home storage of breas tmilk, thawing and warming milk, weaning from nipple shield, weaning from pumping, birth control, OTC and Rx medications. I gave my card for follow with questions as Cheryl and family are moving to [place] on Saturday. A feeding plan was discussed alternating one breast fdg then bottle fdg to gradually progress to exclusive breast feeding with monitoring by peds weight checks.

## 2018-03-23 ENCOUNTER — LACTATION ENCOUNTER (OUTPATIENT)
Age: 33
End: 2018-03-23

## 2018-03-23 NOTE — LACTATION NOTE
"This note was copied from a baby's chart.  Anticipating d/c, reviewed feeding/pumping strategies. Per Dr request she is to fortify fdgs and to only BF x2/day due to low weight gain. I recommended pumping 5 min prior to feeding at breast to aid in Partha getting hind milk. Peds agreed. I encouraged her to update her new Peds in [place] to increase feeding at breast with her pre-pumping to get to hind milk. She reports filling quickly after pumping and we discussed ways to decrease milk volume. She reports a \"hard\" spot on her left breast. We discussed relieving the plugged duct. I also gave information sheet on lecithin. Cheryl is on Coumadin so I conferred with Pharmacist for use with lecithin. Determined use is appropriate. Signs/symptoms for mastitis reviewed. Family d/c by bedside RN.  "

## (undated) DEVICE — DRSG STERI STRIP 1/2X4" R1547

## (undated) DEVICE — GLOVE PROTEXIS MICRO 6.0  2D73PM60

## (undated) DEVICE — SU CHROMIC 1 CT-1 36" 925H

## (undated) DEVICE — LINEN FULL SHEET 5511

## (undated) DEVICE — LINEN HALF SHEET 5512

## (undated) DEVICE — STOCKING SLEEVE VASOPRESS COMPRESSION CALF MED VP501M

## (undated) DEVICE — TRANSFER DEVICE BLOOD NDL HOLDER 364880

## (undated) DEVICE — PREP DURAPREP 26ML APL 8630

## (undated) DEVICE — GLOVE PROTEXIS BLUE W/NEU-THERA 6.5  2D73EB65

## (undated) DEVICE — SU VICRYL 4-0 PS-2 18" UND J496H

## (undated) DEVICE — SU MONOCRYL 2-0 CT-1 36" UND Y945H

## (undated) DEVICE — GLOVE PROTEXIS MICRO 6.5  2D73PM65

## (undated) DEVICE — LINEN TOWEL PACK X10 5473

## (undated) DEVICE — ESU GROUND PAD ADULT W/CORD E7507

## (undated) DEVICE — SU PDS II 0 CT 36" Z358T

## (undated) DEVICE — GLOVE PROTEXIS W/NEU-THERA 7.5  2D73TE75

## (undated) DEVICE — SU VICRYL 2-0 CT-1 27" UND J259H

## (undated) DEVICE — CAP BABY PINK/BLUE IC-2

## (undated) DEVICE — DRSG ADAPTIC 3X8" 6113

## (undated) DEVICE — PACK C-SECTION LF PL15OTA83B

## (undated) DEVICE — BLADE CLIPPER SGL USE 9680

## (undated) DEVICE — CATH TRAY FOLEY 16FR SILICONE 907416

## (undated) DEVICE — BAG CLEAR TRASH 1.3M 39X33" P4040C

## (undated) DEVICE — LINEN BABY BLANKET 5434

## (undated) DEVICE — SUCTION CANISTER MEDIVAC LINER 3000ML W/LID 65651-530

## (undated) DEVICE — DRSG GAUZE 4X8"

## (undated) DEVICE — SOL WATER IRRIG 1000ML BOTTLE 2F7114

## (undated) DEVICE — SOL NACL 0.9% IRRIG 1000ML BOTTLE 2F7124

## (undated) RX ORDER — OXYTOCIN/0.9 % SODIUM CHLORIDE 30/500 ML
PLASTIC BAG, INJECTION (ML) INTRAVENOUS
Status: DISPENSED
Start: 2018-03-06

## (undated) RX ORDER — EPHEDRINE SULFATE 50 MG/ML
INJECTION, SOLUTION INTRAMUSCULAR; INTRAVENOUS; SUBCUTANEOUS
Status: DISPENSED
Start: 2018-03-06

## (undated) RX ORDER — PHENYLEPHRINE HCL IN 0.9% NACL 1 MG/10 ML
SYRINGE (ML) INTRAVENOUS
Status: DISPENSED
Start: 2018-03-06

## (undated) RX ORDER — CEFAZOLIN SODIUM 1 G/3ML
INJECTION, POWDER, FOR SOLUTION INTRAMUSCULAR; INTRAVENOUS
Status: DISPENSED
Start: 2018-03-06

## (undated) RX ORDER — ONDANSETRON 2 MG/ML
INJECTION INTRAMUSCULAR; INTRAVENOUS
Status: DISPENSED
Start: 2018-03-06